# Patient Record
Sex: FEMALE | Race: WHITE | NOT HISPANIC OR LATINO | Employment: PART TIME | URBAN - METROPOLITAN AREA
[De-identification: names, ages, dates, MRNs, and addresses within clinical notes are randomized per-mention and may not be internally consistent; named-entity substitution may affect disease eponyms.]

---

## 2022-04-11 ENCOUNTER — HOSPITAL ENCOUNTER (INPATIENT)
Facility: CLINIC | Age: 32
LOS: 2 days | Discharge: HOME | End: 2022-04-14
Attending: PSYCHIATRY & NEUROLOGY | Admitting: PSYCHIATRY & NEUROLOGY
Payer: COMMERCIAL

## 2022-04-11 DIAGNOSIS — F41.1 GAD (GENERALIZED ANXIETY DISORDER): ICD-10-CM

## 2022-04-11 DIAGNOSIS — Z20.822 LAB TEST NEGATIVE FOR COVID-19 VIRUS: ICD-10-CM

## 2022-04-11 DIAGNOSIS — F32.A DEPRESSION WITH SUICIDAL IDEATION: ICD-10-CM

## 2022-04-11 DIAGNOSIS — L29.2 VULVAR PRURITUS: Primary | ICD-10-CM

## 2022-04-11 DIAGNOSIS — R45.851 DEPRESSION WITH SUICIDAL IDEATION: ICD-10-CM

## 2022-04-11 DIAGNOSIS — J01.90 ACUTE SINUSITIS WITH SYMPTOMS > 10 DAYS: ICD-10-CM

## 2022-04-11 DIAGNOSIS — K30 MILD DIETARY INDIGESTION: ICD-10-CM

## 2022-04-11 LAB
ALBUMIN UR-MCNC: NEGATIVE MG/DL
AMPHETAMINES UR QL SCN: NORMAL
APPEARANCE UR: CLEAR
BACTERIA #/AREA URNS HPF: ABNORMAL /HPF
BARBITURATES UR QL: NORMAL
BENZODIAZ UR QL: NORMAL
BILIRUB UR QL STRIP: NEGATIVE
CANNABINOIDS UR QL SCN: NORMAL
COCAINE UR QL: NORMAL
COLOR UR AUTO: ABNORMAL
GLUCOSE UR STRIP-MCNC: NEGATIVE MG/DL
HCG UR QL: NEGATIVE
HGB UR QL STRIP: ABNORMAL
KETONES UR STRIP-MCNC: NEGATIVE MG/DL
LEUKOCYTE ESTERASE UR QL STRIP: NEGATIVE
MUCOUS THREADS #/AREA URNS LPF: PRESENT /LPF
NITRATE UR QL: NEGATIVE
OPIATES UR QL SCN: NORMAL
PH UR STRIP: 6.5 [PH] (ref 5–7)
RBC URINE: 3 /HPF
SARS-COV-2 RNA RESP QL NAA+PROBE: NEGATIVE
SP GR UR STRIP: 1 (ref 1–1.03)
SQUAMOUS EPITHELIAL: 1 /HPF
UROBILINOGEN UR STRIP-MCNC: NORMAL MG/DL
WBC URINE: 3 /HPF

## 2022-04-11 PROCEDURE — 99285 EMERGENCY DEPT VISIT HI MDM: CPT | Mod: 25 | Performed by: PSYCHIATRY & NEUROLOGY

## 2022-04-11 PROCEDURE — 99285 EMERGENCY DEPT VISIT HI MDM: CPT | Performed by: PSYCHIATRY & NEUROLOGY

## 2022-04-11 PROCEDURE — U0003 INFECTIOUS AGENT DETECTION BY NUCLEIC ACID (DNA OR RNA); SEVERE ACUTE RESPIRATORY SYNDROME CORONAVIRUS 2 (SARS-COV-2) (CORONAVIRUS DISEASE [COVID-19]), AMPLIFIED PROBE TECHNIQUE, MAKING USE OF HIGH THROUGHPUT TECHNOLOGIES AS DESCRIBED BY CMS-2020-01-R: HCPCS | Performed by: PSYCHIATRY & NEUROLOGY

## 2022-04-11 PROCEDURE — 80307 DRUG TEST PRSMV CHEM ANLYZR: CPT | Performed by: PSYCHIATRY & NEUROLOGY

## 2022-04-11 PROCEDURE — 90791 PSYCH DIAGNOSTIC EVALUATION: CPT

## 2022-04-11 PROCEDURE — C9803 HOPD COVID-19 SPEC COLLECT: HCPCS | Performed by: PSYCHIATRY & NEUROLOGY

## 2022-04-11 PROCEDURE — 81001 URINALYSIS AUTO W/SCOPE: CPT | Performed by: PSYCHIATRY & NEUROLOGY

## 2022-04-11 PROCEDURE — 250N000013 HC RX MED GY IP 250 OP 250 PS 637: Performed by: PSYCHIATRY & NEUROLOGY

## 2022-04-11 PROCEDURE — 81025 URINE PREGNANCY TEST: CPT | Performed by: PSYCHIATRY & NEUROLOGY

## 2022-04-11 RX ORDER — TRAZODONE HYDROCHLORIDE 50 MG/1
25 TABLET, FILM COATED ORAL AT BEDTIME
COMMUNITY
Start: 2022-03-23

## 2022-04-11 RX ORDER — LANOLIN ALCOHOL/MO/W.PET/CERES
3 CREAM (GRAM) TOPICAL
Status: DISCONTINUED | OUTPATIENT
Start: 2022-04-11 | End: 2022-04-14 | Stop reason: HOSPADM

## 2022-04-11 RX ORDER — HYDROXYZINE PAMOATE 25 MG/1
25-50 CAPSULE ORAL EVERY 8 HOURS PRN
COMMUNITY
Start: 2022-03-09

## 2022-04-11 RX ORDER — CITALOPRAM HYDROBROMIDE 10 MG/1
5 TABLET ORAL
Status: ON HOLD | COMMUNITY
Start: 2022-04-06 | End: 2022-04-12

## 2022-04-11 RX ORDER — ASCORBIC ACID 500 MG
TABLET ORAL
Status: ON HOLD | COMMUNITY
End: 2022-04-12

## 2022-04-11 RX ORDER — LANOLIN ALCOHOL/MO/W.PET/CERES
1000 CREAM (GRAM) TOPICAL DAILY
Status: DISCONTINUED | OUTPATIENT
Start: 2022-04-12 | End: 2022-04-14 | Stop reason: HOSPADM

## 2022-04-11 RX ORDER — DULOXETIN HYDROCHLORIDE 30 MG/1
30 CAPSULE, DELAYED RELEASE ORAL
Status: ON HOLD | COMMUNITY
Start: 2022-03-02 | End: 2022-04-12

## 2022-04-11 RX ORDER — LORAZEPAM 0.5 MG/1
0.5 TABLET ORAL 2 TIMES DAILY PRN
Status: ON HOLD | COMMUNITY
Start: 2022-03-29 | End: 2022-04-14

## 2022-04-11 RX ORDER — ACETAMINOPHEN 500 MG
500-1000 TABLET ORAL
Status: ON HOLD | COMMUNITY
End: 2022-04-12

## 2022-04-11 RX ORDER — BUSPIRONE HYDROCHLORIDE 5 MG/1
TABLET ORAL
Status: ON HOLD | COMMUNITY
Start: 2022-03-09 | End: 2022-04-12

## 2022-04-11 RX ORDER — LORAZEPAM 0.5 MG/1
0.5 TABLET ORAL DAILY PRN
Status: DISCONTINUED | OUTPATIENT
Start: 2022-04-11 | End: 2022-04-14 | Stop reason: HOSPADM

## 2022-04-11 RX ORDER — DULOXETIN HYDROCHLORIDE 30 MG/1
30 CAPSULE, DELAYED RELEASE ORAL DAILY
Status: DISCONTINUED | OUTPATIENT
Start: 2022-04-12 | End: 2022-04-12

## 2022-04-11 RX ORDER — CITALOPRAM HYDROBROMIDE 10 MG/1
10 TABLET ORAL DAILY
Status: DISCONTINUED | OUTPATIENT
Start: 2022-04-12 | End: 2022-04-12

## 2022-04-11 RX ORDER — BUSPIRONE HYDROCHLORIDE 5 MG/1
5 TABLET ORAL 3 TIMES DAILY
Status: DISCONTINUED | OUTPATIENT
Start: 2022-04-11 | End: 2022-04-12

## 2022-04-11 RX ORDER — HYDROXYZINE HYDROCHLORIDE 25 MG/1
25-50 TABLET, FILM COATED ORAL DAILY PRN
Status: DISCONTINUED | OUTPATIENT
Start: 2022-04-11 | End: 2022-04-13

## 2022-04-11 RX ADMIN — NICOTINE POLACRILEX 2 MG: 2 GUM, CHEWING BUCCAL at 19:25

## 2022-04-11 ASSESSMENT — ENCOUNTER SYMPTOMS
HALLUCINATIONS: 0
CARDIOVASCULAR NEGATIVE: 1
MUSCULOSKELETAL NEGATIVE: 1
RESPIRATORY NEGATIVE: 1
GASTROINTESTINAL NEGATIVE: 1
CONSTITUTIONAL NEGATIVE: 1
NERVOUS/ANXIOUS: 1
NEUROLOGICAL NEGATIVE: 1
HYPERACTIVE: 0
DECREASED CONCENTRATION: 1
EYES NEGATIVE: 1

## 2022-04-11 NOTE — SAFE
Silvia Tidwell  April 11, 2022  SAFE Note    Critical Safety Issues: hopelessness, acute anxiety attacks, sleep difficulties      Current Suicidal Ideation/Self-Injurious Concerns/Methods: Other shooting with a gun.      Current or Historical Inappropriate Sexual Behavior: No      Current or Historical Aggression/Homicidal Ideation: None - N/A      Triggers: medications with side effects.     Updated care team: Yes: MD, intake    For additional details see full [facility] assessment.       URSULA Urias

## 2022-04-11 NOTE — ED TRIAGE NOTES
Patient presents with UTI and increased anxiety with multiple medication changes. Patient would like to be evaluated for UTI and admitted for medication management.
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
## 2022-04-11 NOTE — ED PROVIDER NOTES
"ED Provider Note  [facility]      History     Chief Complaint   Patient presents with     Medication Reaction     Patient is having many issues with changes to medications. Patient is sensitive to medications and has had intermittent thoughts of blowing her brains out. Does not feel suicidal today, but was told to get admitted for medication management.      UTI     Patient was on Macrobid, but stopped it before all the pills were gone. Now UTI symptoms have returned.     HPI  Silvia Tidwell is a 32 year old female who is here accompanied by mother and daughter. Patient has a long history of depression and has had multiple treatments and interventions including hospital stays at [place] in early March 2022 and [place] late March 2022. Patient reports feeling better temporarily. She reports that the meds are not helping and her psychiatric providers have given up on her. She has new psychiatric appointment on 4/28 and therapy services next May. In the meantime she has been feeling overwhelmed. 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 She sleeps poorly and is unable to relax. She reports dealing with a UTI and had been on antibiotics. She has started to feel helpless and hopeless and was thinking of \"blowing her brains out\" yesterday. Her doctor told her to consider [facility] here at [place] as ketamine treatment is provided here.    Patient denies drug use. She is considering starting CBD treatment to address her mood. She denies acute medical concerns and has been vaccinated against COVID. She denies any COVID symptoms presently.    Please see [facility] Crisis Assessment on 4/11/22 in Epic for further details.    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 No pertinent surgical history.  FAMILY HISTORY  History " reviewed. No pertinent family history.  SOCIAL HISTORY  Social History     Tobacco Use     Smoking status: Current Every Day Smoker     Packs/day: 1.00     Types: Cigarettes     Smokeless tobacco: Never Used   Substance Use Topics     Alcohol use: Yes     Comment: rarely     MEDICATIONS  No current facility-administered medications for this encounter.     Current Outpatient Medications   Medication     acetaminophen (TYLENOL) 500 MG tablet     amoxicillin-clavulanate (AUGMENTIN) 500-125 MG per tablet     busPIRone (BUSPAR) 5 MG tablet     citalopram (CELEXA) 10 MG tablet     DULoxetine (CYMBALTA) 30 MG capsule     hydrOXYzine (VISTARIL) 25 MG capsule     LORazepam (ATIVAN) 0.5 MG tablet     melatonin 3 mg, with vit B6 10 mg, 3-10 MG extended release tablet     sertraline (ZOLOFT) 50 MG tablet     traZODone (DESYREL) 50 MG tablet     vitamin B-12 (CYANOCOBALAMIN) 1000 MCG tablet     vitamin C (ASCORBIC ACID) 500 MG tablet     vitamin D3 (CHOLECALCIFEROL) 250 mcg (31637 units) capsule     ALLERGIES  Allergies   Allergen Reactions     Eggs [Chicken-Derived Products (Egg)]      Flagyl [Metronidazole]      Amoxicillin Nausea and Vomiting          Review of Systems   Constitutional: Negative.    HENT: Negative.    Eyes: Negative.    Respiratory: Negative.    Cardiovascular: Negative.    Gastrointestinal: Negative.    Genitourinary: Negative.    Musculoskeletal: Negative.    Skin: Negative.    Neurological: Negative.    Psychiatric/Behavioral: Positive for decreased concentration and suicidal ideas. Negative for hallucinations. The patient is nervous/anxious. The patient is not hyperactive.    All other systems reviewed and are negative.        Physical Exam   BP: 117/75  Pulse: 90  Temp: 98.3  F (36.8  C)  Resp: 18  Weight: 111.1 kg (245 lb)  SpO2: 99 %  Physical Exam  Vitals and nursing note reviewed.   HENT:      Head: Normocephalic.   Eyes:      Pupils: Pupils are equal, round, and reactive to light.   Pulmonary:       Effort: Pulmonary effort is normal.   Musculoskeletal:         General: Normal range of motion.      Cervical back: Normal range of motion.   Neurological:      General: No focal deficit present.      Mental Status: She is alert.   Psychiatric:         Attention and Perception: Attention and perception normal. She does not perceive auditory or visual hallucinations.         Mood and Affect: Affect normal. Mood is anxious.         Speech: Speech normal.         Behavior: Behavior normal. Behavior is not agitated, slowed, aggressive or hyperactive. Behavior is cooperative.         Thought Content: Thought content is not paranoid or delusional. Thought content includes suicidal ideation. Thought content does not include homicidal ideation.         Cognition and Memory: Cognition and memory normal.         Judgment: Judgment normal.         ED Course      Procedures         Mental Health Risk Assessment      PSS-3    Date and Time Over the past 2 weeks have you felt down, depressed, or hopeless? Over the past 2 weeks have you had thoughts of killing yourself? Have you ever attempted to kill yourself? When did this last happen? User   04/11/22 1401 yes yes no -- TMW      C-SSRS ([place])    Date and Time Q1 Wished to be Dead (Past Month) Q2 Suicidal Thoughts (Past Month) Q3 Suicidal Thought Method Q4 Suicidal Intent without Specific Plan Q5 Suicide Intent with Specific Plan Q6 Suicide Behavior (Lifetime) Within the Past 3 Months? RETIRED: Level of Risk per Screen Screening Not Complete User   04/11/22 1401 yes yes yes no no no -- -- -- TMW              Suicide assessment completed by mental health (D.E.C., LCSW, etc.)       Results for orders placed or performed during the hospital encounter of 04/11/22   HCG qualitative urine     Status: Normal   Result Value Ref Range    hCG Urine Qualitative Negative Negative   Drug abuse screen 1 urine (ED)     Status: Normal   Result Value Ref Range    Amphetamines Urine Screen  "Negative Screen Negative    Barbiturates Urine Screen Negative Screen Negative    Benzodiazepines Urine Screen Negative Screen Negative    Cannabinoids Urine Screen Negative Screen Negative    Cocaine Urine Screen Negative Screen Negative    Opiates Urine Screen Negative Screen Negative   UA with Microscopic reflex to Culture     Status: Abnormal    Specimen: Urine, Midstream   Result Value Ref Range    Color Urine Straw Colorless, Straw, Light Yellow, Yellow    Appearance Urine Clear Clear    Glucose Urine Negative Negative mg/dL    Bilirubin Urine Negative Negative    Ketones Urine Negative Negative mg/dL    Specific Gravity Urine 1.003 1.003 - 1.035    Blood Urine Moderate (A) Negative    pH Urine 6.5 5.0 - 7.0    Protein Albumin Urine Negative Negative mg/dL    Urobilinogen Urine Normal Normal, 2.0 mg/dL    Nitrite Urine Negative Negative    Leukocyte Esterase Urine Negative Negative    Bacteria Urine Few (A) None Seen /HPF    Mucus Urine Present (A) None Seen /LPF    RBC Urine 3 (H) <=2 /HPF    WBC Urine 3 <=5 /HPF    Squamous Epithelials Urine 1 <=1 /HPF    Narrative    Urine Culture not indicated   Urine Drugs of Abuse Screen     Status: Normal    Narrative    The following orders were created for panel order Urine Drugs of Abuse Screen.  Procedure                               Abnormality         Status                     ---------                               -----------         ------                     Drug abuse screen 1 urin...[[id number]]  Normal              Final result                 Please view results for these tests on the individual orders.     Medications - No data to display     Assessments & Plan (with Medical Decision Making)   Patient with persistent depression that appears treatment-resistant. She is feeling hopeless and helpless and was recently suicidal with thoughts of \"blowing her brains out.\" She would like to be admitted for further intervention. She tried inpatient treatment at [facility] last month and did not feel lasting benefit. She seeks care and intervention here, specifically ketamine if possible. Patient is referred for admission. She is too acute for an IOP or PHP as she needs urgent medication intervention rather than programmatic care which she may benefit from once stabilization. She is voluntary.    I have reviewed the nursing notes. I have reviewed the findings, diagnosis, plan and need for follow up with the patient.    New Prescriptions    No medications on file       Final diagnoses:   Depression with suicidal ideation   JAE (generalized anxiety disorder)       --  Murali Aguirre MD  [facility] EMERGENCY DEPARTMENT  4/11/2022     Murali Aguirre MD  04/11/22 1955

## 2022-04-12 ENCOUNTER — APPOINTMENT (OUTPATIENT)
Dept: MRI IMAGING | Facility: CLINIC | Age: 32
End: 2022-04-12
Payer: COMMERCIAL

## 2022-04-12 PROBLEM — R45.851 DEPRESSION WITH SUICIDAL IDEATION: Status: ACTIVE | Noted: 2022-04-12

## 2022-04-12 PROBLEM — F32.A DEPRESSION WITH SUICIDAL IDEATION: Status: ACTIVE | Noted: 2022-04-12

## 2022-04-12 LAB
DEPRECATED CALCIDIOL+CALCIFEROL SERPL-MC: 30 UG/L (ref 20–75)
HOLD SPECIMEN: NORMAL
HOLD SPECIMEN: NORMAL

## 2022-04-12 PROCEDURE — 99223 1ST HOSP IP/OBS HIGH 75: CPT | Mod: AI | Performed by: PSYCHIATRY & NEUROLOGY

## 2022-04-12 PROCEDURE — 124N000002 HC R&B MH UMMC

## 2022-04-12 PROCEDURE — 250N000013 HC RX MED GY IP 250 OP 250 PS 637: Performed by: STUDENT IN AN ORGANIZED HEALTH CARE EDUCATION/TRAINING PROGRAM

## 2022-04-12 PROCEDURE — 70551 MRI BRAIN STEM W/O DYE: CPT

## 2022-04-12 PROCEDURE — 36415 COLL VENOUS BLD VENIPUNCTURE: CPT | Performed by: STUDENT IN AN ORGANIZED HEALTH CARE EDUCATION/TRAINING PROGRAM

## 2022-04-12 PROCEDURE — 82306 VITAMIN D 25 HYDROXY: CPT | Performed by: STUDENT IN AN ORGANIZED HEALTH CARE EDUCATION/TRAINING PROGRAM

## 2022-04-12 PROCEDURE — 70551 MRI BRAIN STEM W/O DYE: CPT | Mod: 26 | Performed by: RADIOLOGY

## 2022-04-12 PROCEDURE — H2032 ACTIVITY THERAPY, PER 15 MIN: HCPCS

## 2022-04-12 PROCEDURE — 250N000013 HC RX MED GY IP 250 OP 250 PS 637: Performed by: PSYCHIATRY & NEUROLOGY

## 2022-04-12 RX ORDER — OLANZAPINE 10 MG/2ML
10 INJECTION, POWDER, FOR SOLUTION INTRAMUSCULAR 3 TIMES DAILY PRN
Status: DISCONTINUED | OUTPATIENT
Start: 2022-04-12 | End: 2022-04-14 | Stop reason: HOSPADM

## 2022-04-12 RX ORDER — POLYETHYLENE GLYCOL 3350 17 G/17G
17 POWDER, FOR SOLUTION ORAL DAILY PRN
Status: DISCONTINUED | OUTPATIENT
Start: 2022-04-12 | End: 2022-04-14 | Stop reason: HOSPADM

## 2022-04-12 RX ORDER — ACETAMINOPHEN 325 MG/1
650 TABLET ORAL EVERY 4 HOURS PRN
Status: DISCONTINUED | OUTPATIENT
Start: 2022-04-12 | End: 2022-04-14 | Stop reason: HOSPADM

## 2022-04-12 RX ORDER — MAGNESIUM HYDROXIDE/ALUMINUM HYDROXICE/SIMETHICONE 120; 1200; 1200 MG/30ML; MG/30ML; MG/30ML
30 SUSPENSION ORAL EVERY 4 HOURS PRN
Status: DISCONTINUED | OUTPATIENT
Start: 2022-04-12 | End: 2022-04-14 | Stop reason: HOSPADM

## 2022-04-12 RX ORDER — OLANZAPINE 5 MG/1
10 TABLET ORAL 3 TIMES DAILY PRN
Status: DISCONTINUED | OUTPATIENT
Start: 2022-04-12 | End: 2022-04-14 | Stop reason: HOSPADM

## 2022-04-12 RX ADMIN — CYANOCOBALAMIN TAB 1000 MCG 1000 MCG: 1000 TAB at 08:38

## 2022-04-12 RX ADMIN — NICOTINE POLACRILEX 2 MG: 2 GUM, CHEWING BUCCAL at 14:08

## 2022-04-12 RX ADMIN — ACETAMINOPHEN 650 MG: 325 TABLET ORAL at 18:28

## 2022-04-12 RX ADMIN — ACETAMINOPHEN 650 MG: 325 TABLET ORAL at 08:44

## 2022-04-12 RX ADMIN — MELATONIN TAB 3 MG 3 MG: 3 TAB at 01:21

## 2022-04-12 RX ADMIN — MELATONIN TAB 3 MG 3 MG: 3 TAB at 22:56

## 2022-04-12 RX ADMIN — NICOTINE POLACRILEX 2 MG: 2 GUM, CHEWING BUCCAL at 18:31

## 2022-04-12 ASSESSMENT — ACTIVITIES OF DAILY LIVING (ADL)
LAUNDRY: WITH SUPERVISION
ORAL_HYGIENE: INDEPENDENT
HYGIENE/GROOMING: INDEPENDENT
ORAL_HYGIENE: INDEPENDENT
HYGIENE/GROOMING: INDEPENDENT
DRESS: INDEPENDENT
DRESS: STREET CLOTHES;INDEPENDENT
LAUNDRY: WITH SUPERVISION

## 2022-04-12 NOTE — PLAN OF CARE
Problem: Sleep Disturbance  Goal: Adequate Sleep/Rest  Outcome: Ongoing, Progressing      At 0100, a 32 year old patient arrived station 20 on wheel chair, brought by psych associate  from [facility]. Per ED note, patient has had multiple psychiatrics hospital stay,  Pt reports several med changes that are not helping. 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 She has started to feel hopeless and overwhelmed and thinking of blowing her brain out. Pt was encouraged to come in by he PCP for stabilization.  Patient appeared  tired when she arrived at [facility]. Patient was cooperative with search and admission process. Denies SI/SIB/hallucination and thought of harming herself. Denies anxiety and depression, and denies pain. Patient verbalized to contract for safety. PRN melatonin 3 mg was given per patient request, and patient is sleeping at this time.    Pt appeared to have slept for 5 hours.15 minutes safety checks was in place during shift.Staff will continue to  monitor and offer support as needed to patient.

## 2022-04-12 NOTE — PHARMACY-ADMISSION MEDICATION HISTORY
Admission Medication History Completed by Pharmacy    See [facility] Admission Navigator for allergy information, preferred outpatient pharmacy, prior to admission medications and immunization status.     Medication History Sources:     Patient    [facility] from 3/29/22    Changes made to PTA medication list (reason):    Added: None    Deleted: acetaminophen, Aumgentin, buspirone, citalopram, duloxetine, sertraline, vitamin C (not taking per pt)    Changed:   o Added directions to lorazepam, hydroxyzine, melatonin, trazodone, vit D    Additional Information:    Pt recently admitted to [facility] 3/23-3/29; buspirone, citalopram, and clonidine were discontinued during that hospitalization.    Pt reported she had side effects to citalopram and sertraline causing increased SI and anxiety    Pt was recently treated for a UTI (with nitrofurantoin) and sinusitis (with amoxicillin and then Augmentin). She feels these may not be resolved as she stopped antibiotics early.    Pt reports her CPAP machine at home may not be fitting right and the apnea may be causing headaches/migraines. Pt asking about getting this assessed while here.  Per [place] :  Lorazepam 0.5 mg filled 3/29/22 for #30 (15 DS), 3/11/22 for #20 (10 DS)  Zolpidem 5 mg filled 3/2/22 for #30 (30 DS)  Alprazolam 0.25 mg filled 3/2/22 for #10 (4 DS)  Diazepam 2 mg filled 2/28/22 for #3 (1 DS)      Prior to Admission medications    Medication Sig Last Dose Taking? Auth Provider   hydrOXYzine (VISTARIL) 25 MG capsule Take 25-50 mg by mouth every 8 hours as needed for anxiety (sleep) Past Week at Unknown time Yes Reported, Patient   LORazepam (ATIVAN) 0.5 MG tablet Take 0.5 mg by mouth 2 times daily as needed for anxiety 4/10/2022 at Unknown time Yes Reported, Patient   melatonin 3 mg, with vit B6 10 mg, 3-10 MG extended release tablet Take 3 mg by mouth nightly as needed for  sleep 4/11/2022 at Unknown time Yes Reported, Patient   traZODone (DESYREL) 50 MG tablet Take 25 mg by mouth At Bedtime Past Month at Unknown time Yes Reported, Patient   vitamin B-12 (CYANOCOBALAMIN) 1000 MCG tablet Take 1,000 mcg by mouth in the morning. Past Week at Unknown time Yes Reported, Patient   Vitamin D3 (CHOLECALCIFEROL) 125 MCG (5000 UT) tablet Take 125 mcg by mouth in the morning. Past Week at Unknown time Yes Unknown, Entered By History       Date completed: 04/12/22    Medication history completed by: Nia Barba, [facility]

## 2022-04-12 NOTE — ED NOTES
Writer attempted to call report to station 22, per MICHAEL Braun, the unit is not able to take the patient at this time.

## 2022-04-12 NOTE — PLAN OF CARE
Problem: Suicidal Behavior  Goal: Suicidal Behavior is Absent or Managed  Outcome: Ongoing, Progressing     Patient up and visible on the unit most part of he shift. Engaged and social with select peers, watching TV, attended group, endorsed anxiety and depression, denies all other psych symptoms, complained of generalized body pain, medicated patient with PRN tylenol. Patient is able to make needs known, needy for the most part, eating and drinking well, no other known concerns at this time. Will continue to offer support as needed for the rest of the shift.

## 2022-04-12 NOTE — DISCHARGE INSTRUCTIONS
Behavioral Discharge Planning and Instructions    Summary:   You were admitted to [facility] on 4/11/22 with worsening depression/anxiety.  You met with Psychiatric team daily for ongoing psychiatric assessment and medication management.  You had opportunities to participate in therapeutic groups on the unit.   At this time you report your mood is stabilizing and you report you are not having thoughts or intent to harm yourself or others. You will be discharged home and will resume care with your outpatient providers.    Disposition:  Home      Main Diagnosis:   MDD  JAE    Health Care Follow-up:   Appointment:  Psychiatrist: Baldev Hood MD: Intake appt. 4/28/22  [facility] [place]  [address] [place] [address]  [phone]               Permanent Therapist Leno Burciaga PsyD: first appt.  5/3/22  [address] [place] [address] [phone]    Therapist: Temp. Therapist: Latha ELAINE next appt. 4/13/22  [address] [place] [address]  Phone: [phone]  Fax: [phone]     [facility] worker:  [facility],  [address],  [place] [address] [phone]    Major Treatments, Procedures and Findings:   Medications were  managed throughout your stay. An internal medicine consult was completed during your stay. You had the opportunity to participate in treatment programming while on the unit including occupational therapy, mental health support and education and spiritual services.     Symptoms to Report:   Please report if you are experiencing increased aggression and/or confusion, problematic loss of sleep, worsening mood, or thoughts of suicide to your treatment team or notify your primary provider.   IF THE SYMPTOMS YOU ARE EXPERIENCING ARE A MEDICAL EMERGENCY, CALL 911 IMMEDIATELY    Lifestyle Adjustment:   1. Adjust your lifestyle to get enough sleep, relaxation, exercise and good nutrition.  Continue to develop healthy coping skills to decrease  stress and promote a healthy and sober lifestyle.  2. Abstain from all substances of abuse.  3. Take medications as prescribed.  Please work with your doctor to discuss any concerns you have with your medications or side effects you may be experiencing.  4. Follow up with appointments as scheduled.      Resources:   Mental Health Crisis Services for [facility]    [phone] or [phone]  24 hours a day/7 days a week    The Crisis Response Team (CRT) is a group of counselors who provide support and assistance to children and adults experiencing a mental health crisis. Call-in services include a crisis hotline, information and referrals, and a link to resources and support. If needed the CRT will travel to your home or a community location to de-escalate the situation and help the individual in crisis cope with immediate stressors.    If further intervention or de-escalation is needed, Overnight Crisis Care and Stabilization is available for youth ages 5-17, and the Residential Adult Stabilization program provides a safe place for adults to stay. Crisis Text Line  Text [place] to 353651    Free 24/7 support at your fingertips from anywhere, anytime, about any type of crisis. A live, trained Crisis Counselor receives the text and responds, all from a secure online platform. The volunteer Crisis Counselor will help you move from a hot moment to a cool moment.    National Suicide Prevention Lifeline  3-604-347-TALK (6463)  24 hours a day/7 days a week        [facility] ~ Peer Support Connection  Call or Text: [phone], 5:00pm to 9:00am  The Peer Support Connection Warmline [facility] is staffed by mental health consumers who provide support to peers by telephone. All phone calls are confidential and callers remain anonymous. Warmline Support Specialists are trained to actively listen to their peers, empathize with their concerns and empower individuals to choose their path to wellness and recovery. If  "you'd like to talk to someone who truley understands your struggle and has \"been there before\" please call or text.      General Medication Instructions:   See your medication sheet(s) for instructions.   Take all medicines as directed.  Make no changes unless your doctor suggests them.   Go to all your doctor visits.  Be sure to have all your required lab tests. This way, your medicines can be refilled on time.  Do not use any drugs not prescribed by your doctor.    Advance Directives:   Scanned document on file with [employer]? No scanned doc  Is document scanned? Pt states no documents  Honoring Choices Your Rights Handout: Informed and given  Was more information offered? Materials given    The Treatment team has appreciated the opportunity to work with you. If you have any questions or concerns about your recent admission, you can contact the unit which can receive your call 24 hours a day, 7 days a week. They will be able to get in touch with a Provider if needed. The unit number is [phone]   "

## 2022-04-12 NOTE — PLAN OF CARE
04/12/22 0242   Patient Belongings   Patient Belongings locker;sent to security per site process   Patient Belongings Put in Hospital Secure Location (Security or Locker, etc.) cash/credit card;cell phone/electronics;clothing;keys;money (see comment);medication(s);purse/wallet;suitcase;other (see comments)   Belongings Search Yes   Clothing Search Yes   Second Staff Mima HOLLINGSWORTH                  Patient arrived to unit with the following belongings:  Clothing: Shirt(s):  , Pants:  , Underclothes:  , and Outerwear:    1 black hooded sweater, 1 blue pajama pants, 1 grey and yellow hooded sweater, 1 blue and grey pajama pants, 1 blue hooded sweater, 1 grey tank top, 1 black tank top, 1 pink floral shirt, 1 grey and black long sleeve shirt, 1 grey and black t shirt, 1 black and green t shirt, 1 purple and white t shirt, 1 grey camo sweat pants, 1 grey and brown shirt, 1 blue t shirt, 1 tan grey hooded sweater, 5 bras (3 black, 2 tan), 1 blue tank top, 1 green tank top, 1 burgundy long sleeve shirt, 1 pink tie die pants, 5 pair of black pants, 1 green coat, 13 pair of socks (pack of 10 unopened), 8 pair of underwear, 1 colorful blanket. 1 pack of rolled cigarettes in colorful skull pack  Electronic devices including cell phones: Yes. 1 black cell phone(no scratches on front) with burgundy protective case. 1 cell phone  with cord and base. Additional  base no cord.  Jewelry: No   Medications: Yes. Melatonin 3mg, lorazepam 0.5mg, hydroxyzine pamoate 25mg, sertraline 50mg, trazodone 100mg, vitamin D3, pain reliever 500mg, refresh eye drops   Wallet: Yes.  Kade miles purse wallet. Kade marquez purse  Items sent to security: Yes. $54 cash,  [employer] gift card [id number], [employer] card [id number], national bank debit [id number], [employer] card [id number], $50 [employer] gift card, $50 [employer] card, [place] EBT card [id number], [place] card [id number]      Admission:  I am responsible for any personal items that are not sent to the safe or  pharmacy.  [facility] is not responsible for loss, theft or damage of any property in my possession.    Signature:  _________________________________ Date: _______  Time: _____                                              Staff Signature:  ____________________________ Date: ________  Time: _____      2nd Staff person, if patient is unable/unwilling to sign:    Signature: ________________________________ Date: ________  Time: _____     Discharge:  [facility] has returned all of my personal belongings:    Signature: _________________________________ Date: ________  Time: _____                                          Staff Signature:  ____________________________ Date: ________  Time: _____    Goal Outcome Evaluation:

## 2022-04-12 NOTE — ED NOTES
ED to Behavioral Floor Handoff    SITUATION  Silvia Tidwell is a 32 year old female who speaks English and lives in a home with others The patient arrived in the ED by private car from home with a complaint of Medication Reaction (Patient is having many issues with changes to medications. Patient is sensitive to medications and has had intermittent thoughts of blowing her brains out. Does not feel suicidal today, but was told to get admitted for medication management. ) and UTI (Patient was on Macrobid, but stopped it before all the pills were gone. Now UTI symptoms have returned.)  .The patient's current symptoms started/worsened 5 day(s) ago and during this time the symptoms have remained the same.   In the ED, pt was diagnosed with   Final diagnoses:   Depression with suicidal ideation   JAE (generalized anxiety disorder)        Initial vitals were: BP: 117/75  Pulse: 90  Temp: 98.3  F (36.8  C)  Resp: 18  Weight: 111.1 kg (245 lb)  SpO2: 99 %   --------  Is the patient diabetic? No   If yes, last blood glucose? --     If yes, was this treated in the ED? --  --------  Is the patient inebriated (ETOH) No or Impaired on other substances? No  MSSA done? N/A  Last MSSA score: --    Were withdrawal symptoms treated? N/A  Does the patient have a seizure history? No. If yes, date of most recent seizure NA    Is the patient patient experiencing suicidal ideation? reports occasional suicidal thoughts representing feeling that life is not worth feeling    Homicidal ideation? denies current or recent homicidal ideation or behaviors.    Self-injurious behavior/urges? denies current or recent self injurious behavior or ideation.  ------  Was pt aggressive in the ED No  Was a code called No  Is the pt now cooperative? Yes  -------  Meds given in ED:   Medications   nicotine (NICORETTE) gum 2 mg (2 mg Buccal Given 4/11/22 1925)   sertraline (ZOLOFT) tablet 50 mg (has no administration in time range)   citalopram (celeXA) tablet  10 mg (has no administration in time range)   LORazepam (ATIVAN) tablet 0.5 mg (has no administration in time range)   busPIRone (BUSPAR) tablet 5 mg (5 mg Oral Not Given 4/11/22 2233)   hydrOXYzine (ATARAX) tablet 25-50 mg (has no administration in time range)   DULoxetine (CYMBALTA) DR capsule 30 mg (has no administration in time range)   cyanocobalamin (VITAMIN B-12) tablet 1,000 mcg (has no administration in time range)   melatonin tablet 3 mg (has no administration in time range)      Family present during ED course? Yes  Family currently present? No    BACKGROUND  Does the patient have a cognitive impairment or developmental disability? No  Allergies:   Allergies   Allergen Reactions     Eggs [Chicken-Derived Products (Egg)]      Flagyl [Metronidazole]      Amoxicillin Nausea and Vomiting   .   Social demographics are   Social History     Socioeconomic History     Marital status: Single     Spouse name: None     Number of children: None     Years of education: None     Highest education level: None   Tobacco Use     Smoking status: Current Every Day Smoker     Packs/day: 1.00     Types: Cigarettes     Smokeless tobacco: Never Used   Substance and Sexual Activity     Alcohol use: Yes     Comment: rarely     Drug use: No     Sexual activity: Yes     Partners: Male     Birth control/protection: None        ASSESSMENT  Labs results   Labs Ordered and Resulted from Time of ED Arrival to Time of ED Departure   ROUTINE UA WITH MICROSCOPIC REFLEX TO CULTURE - Abnormal       Result Value    Color Urine Straw      Appearance Urine Clear      Glucose Urine Negative      Bilirubin Urine Negative      Ketones Urine Negative      Specific Gravity Urine 1.003      Blood Urine Moderate (*)     pH Urine 6.5      Protein Albumin Urine Negative      Urobilinogen Urine Normal      Nitrite Urine Negative      Leukocyte Esterase Urine Negative      Bacteria Urine Few (*)     Mucus Urine Present (*)     RBC Urine 3 (*)     WBC Urine  3      Squamous Epithelials Urine 1     HCG QUALITATIVE URINE - Normal    hCG Urine Qualitative Negative     DRUG ABUSE SCREEN 1 URINE (ED) - Normal    Amphetamines Urine Screen Negative      Barbiturates Urine Screen Negative      Benzodiazepines Urine Screen Negative      Cannabinoids Urine Screen Negative      Cocaine Urine Screen Negative      Opiates Urine Screen Negative     COVID-19 VIRUS (CORONAVIRUS) BY PCR - Normal    SARS CoV2 PCR Negative        Imaging Studies: No results found for this or any previous visit (from the past 24 hour(s)).   Most recent vital signs /75 (BP Location: Left arm, Patient Position: Sitting)   Pulse 90   Temp 98.3  F (36.8  C) (Oral)   Resp 18   Wt 111.1 kg (245 lb)   LMP 05/02/2016   SpO2 99%   BMI 39.54 kg/m     Abnormal labs/tests/findings requiring intervention:---   Pain control: pt had none  Nausea control: pt had none    RECOMMENDATION  Are any infection precautions needed (MRSA, VRE, etc.)? No If yes, what infection? --  ---  Does the patient have mobility issues? independently. If yes, what device does the pt use? ---  ---  Is patient on 72 hour hold or commitment? No If on 72 hour hold, have hold and rights been given to patient? N/A  Are admitting orders written if after 10 p.m. ?N/A  Tasks needing to be completed none    Katherine Barry RN   [facility] [place]   [phone] [facility]   [phone] [facility] ED
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
## 2022-04-12 NOTE — PLAN OF CARE
Pt has been in a mostly irritable, tense mood throughout the day. Upon waking and first approaching the nursing station, pt began communicating that she was unsatisfied with her care so far at the hospital and was considering discharging herself. Writer offered empathetic listening and assessed pt's needs at that time. As requested, pt was oriented to the unit, breakfast food items were obtained, medications were discussed and hospital contact information was provided for pt to give to family members. Pt was encouraged to attend groups and discuss her psychiatric treatment concerns with her doctor later today. Pt had c/o headache, for which she received PRN Tylenol (650mg). She refused all morning medications, except the B-12 vitamin, stating that they were not her current correct medication regimen. She denies any current, active suicidal ideation but does endorse feeling frustrated and hopeless with being able to find the psychiatric care she feels she needs in the context of multiple, recent inpatient hospitalizations.     Problem: Plan of Care - These are the overarching goals to be used throughout the patient stay.    Goal: Plan of Care Review/Shift Note  Description: The Plan of Care Review/Shift note should be completed every shift.  The Outcome Evaluation is a brief statement about your assessment that the patient is improving, declining, or no change.  This information will be displayed automatically on your shift note.  Flowsheets  Taken 4/12/2022 1149  Plan of Care Reviewed With: patient  Taken 4/12/2022 1100  Plan of Care Reviewed With: patient

## 2022-04-12 NOTE — ED NOTES
Writer attempted to call report for station 20. They are not ready for the patent. They asked this writer to call next shift.

## 2022-04-12 NOTE — PROGRESS NOTES
04/12/22 1546   Group Therapy Session   Group Attendance attended group session   Time Session Began 1115   Time Session Ended 1200   Total Time patient participated (minutes) 45   Total # Attendees 3   Group Type expressive therapy   Group Topic Covered emotions/expression   Patient Response/Contribution cooperative with task     Art Therapy directive was to create a vision board collage.  Goals of directive: identifying personal strengths and goals.  Pt was a quiet, engaged participant, focused on task for the full duration of group. Pt has not yet finished collage and needs a second AT group to finish. Pt shared several personal stories during group and became tearful at times.   Pts mood was depressed.
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
## 2022-04-12 NOTE — PLAN OF CARE
04/12/22 1512   Group Therapy Session   Group Attendance attended group session   Time Session Began 0115   Time Session Ended 1510   Total Time patient participated (minutes) 55   Total # Attendees 3   Group Type psychoeducation   Group Topic Covered coping skills/lifestyle management;emotions/expression;anger/conflict management   Group Session Detail Kristy Quezada: Why your critics aren t the one who count   Patient Response/Contribution cooperative with task;expressed readiness to alter behaviors;listened actively;offered helpful suggestions to peers   Patient Response Detail Patient was pleasant and able to engaged in group discussion. She took notes and shared her thoughts on what has been difficult in her path to recovdery. She became tearful when sharing personal life experiences.

## 2022-04-12 NOTE — PROGRESS NOTES
"   04/12/22 1600   Group Therapy Session   Group Attendance attended group session   Time Session Began 1315   Time Session Ended 1400   Total Time patient participated (minutes) 45   Total # Attendees 3   Group Type expressive therapy;psychotherapeutic   Group Topic Covered emotions/expression   Patient Response/Contribution cooperative with task     Topic group discussion was about the DBT skill \"Riding the Wave\" Group discussed distress tolerance and read from handout. Pts were then encouraged to create process art of this DBT skill. Pt chose to continue to work on collage from previous group. Pt's mood was depressed, tearful at times when sharing personal stories with author.        "

## 2022-04-12 NOTE — PLAN OF CARE
04/12/22 0918   Individualization/Patient Specific Goals   Patient Personal Strengths community support;expressive of needs;expressive of emotions;independent living skills;medication/treatment adherence;motivated for treatment;resilient;resourceful;stable living environment   Patient Vulnerabilities adverse childhood experience(s);family/relationship conflict;traumatic event   Anxieties, Fears or Concerns That I wont get better   Patient-Specific Goals (Include Timeframe) 1. Stabilization of mood/anxiety 2. Absence of SI-safe with self  3. Medication mgmt   4. Coordination of care with outpatient providers  5.outpatient follow up care in place   Interprofessional Rounds   Participants CTC;nursing;patient;psychiatrist;other (see comments)  (med students)   Team Discussion   Participants Dr. Carrillo, Dr. Covarrubias, Dr. Shelton, Halle Traore RN, Carolina Heath MA.LP, Med students   Progress Initial assessment   Anticipated length of stay 3-5 days   Continued Stay Criteria/Rationale Worsening depression/SI   Medical/Physical UTI   Plan Psychiatric team to assess patient daily.  Medications will be reviewed/adjusted as indicated per MD's.  Patient will be provided TIPPS skills and other anxiety reduction techniques.  Patient will be encouraged to attend groups/activiteis on the unit.  CTC will continue to assess needs, ensure appropriate f/u care is in place.   Rationale for change in precautions or plan No change in plan/precautions   Safety Plan Patient will complete safety plan   Anticipated Discharge Disposition home or self-care        ------  I have reviewed this note. Lilibeth Carrillo MD

## 2022-04-13 LAB
ALBUMIN UR-MCNC: NEGATIVE MG/DL
APPEARANCE UR: CLEAR
BILIRUB UR QL STRIP: NEGATIVE
CHOLEST SERPL-MCNC: 125 MG/DL
CLUE CELLS: ABNORMAL
COLOR UR AUTO: NORMAL
FOLATE SERPL-MCNC: 17.5 NG/ML
GLUCOSE UR STRIP-MCNC: NEGATIVE MG/DL
HDLC SERPL-MCNC: 37 MG/DL
HGB UR QL STRIP: NEGATIVE
HOLD SPECIMEN: NORMAL
KETONES UR STRIP-MCNC: NEGATIVE MG/DL
LDLC SERPL CALC-MCNC: 69 MG/DL
LEUKOCYTE ESTERASE UR QL STRIP: NEGATIVE
NITRATE UR QL: NEGATIVE
NONHDLC SERPL-MCNC: 88 MG/DL
PH UR STRIP: 6.5 [PH] (ref 5–7)
SP GR UR STRIP: 1 (ref 1–1.03)
TRICHOMONAS, WET PREP: ABNORMAL
TRIGL SERPL-MCNC: 96 MG/DL
TSH SERPL DL<=0.005 MIU/L-ACNC: 1.31 MU/L (ref 0.4–4)
UROBILINOGEN UR STRIP-MCNC: NORMAL MG/DL
VIT B12 SERPL-MCNC: 639 PG/ML (ref 193–986)
WBC'S/HIGH POWER FIELD, WET PREP: ABNORMAL
YEAST, WET PREP: ABNORMAL

## 2022-04-13 PROCEDURE — 81003 URINALYSIS AUTO W/O SCOPE: CPT

## 2022-04-13 PROCEDURE — 80061 LIPID PANEL: CPT | Performed by: STUDENT IN AN ORGANIZED HEALTH CARE EDUCATION/TRAINING PROGRAM

## 2022-04-13 PROCEDURE — 84443 ASSAY THYROID STIM HORMONE: CPT | Performed by: STUDENT IN AN ORGANIZED HEALTH CARE EDUCATION/TRAINING PROGRAM

## 2022-04-13 PROCEDURE — 82746 ASSAY OF FOLIC ACID SERUM: CPT | Performed by: STUDENT IN AN ORGANIZED HEALTH CARE EDUCATION/TRAINING PROGRAM

## 2022-04-13 PROCEDURE — 87210 SMEAR WET MOUNT SALINE/INK: CPT

## 2022-04-13 PROCEDURE — 250N000013 HC RX MED GY IP 250 OP 250 PS 637

## 2022-04-13 PROCEDURE — 250N000013 HC RX MED GY IP 250 OP 250 PS 637: Performed by: PSYCHIATRY & NEUROLOGY

## 2022-04-13 PROCEDURE — 250N000013 HC RX MED GY IP 250 OP 250 PS 637: Performed by: STUDENT IN AN ORGANIZED HEALTH CARE EDUCATION/TRAINING PROGRAM

## 2022-04-13 PROCEDURE — 99232 SBSQ HOSP IP/OBS MODERATE 35: CPT | Mod: GC | Performed by: PSYCHIATRY & NEUROLOGY

## 2022-04-13 PROCEDURE — 82607 VITAMIN B-12: CPT | Performed by: STUDENT IN AN ORGANIZED HEALTH CARE EDUCATION/TRAINING PROGRAM

## 2022-04-13 PROCEDURE — H2032 ACTIVITY THERAPY, PER 15 MIN: HCPCS

## 2022-04-13 PROCEDURE — G0177 OPPS/PHP; TRAIN & EDUC SERV: HCPCS

## 2022-04-13 PROCEDURE — 99221 1ST HOSP IP/OBS SF/LOW 40: CPT

## 2022-04-13 PROCEDURE — 36415 COLL VENOUS BLD VENIPUNCTURE: CPT | Performed by: STUDENT IN AN ORGANIZED HEALTH CARE EDUCATION/TRAINING PROGRAM

## 2022-04-13 PROCEDURE — 124N000002 HC R&B MH UMMC

## 2022-04-13 RX ORDER — DOXYCYCLINE 100 MG/1
100 CAPSULE ORAL EVERY 12 HOURS SCHEDULED
Status: DISCONTINUED | OUTPATIENT
Start: 2022-04-13 | End: 2022-04-14 | Stop reason: HOSPADM

## 2022-04-13 RX ORDER — HYDROXYZINE HYDROCHLORIDE 25 MG/1
25-50 TABLET, FILM COATED ORAL 3 TIMES DAILY PRN
Status: DISCONTINUED | OUTPATIENT
Start: 2022-04-13 | End: 2022-04-14 | Stop reason: HOSPADM

## 2022-04-13 RX ORDER — VITAMIN B COMPLEX
125 TABLET ORAL DAILY
Status: DISCONTINUED | OUTPATIENT
Start: 2022-04-13 | End: 2022-04-14 | Stop reason: HOSPADM

## 2022-04-13 RX ORDER — IBUPROFEN 400 MG/1
400 TABLET, FILM COATED ORAL EVERY 6 HOURS PRN
Status: DISCONTINUED | OUTPATIENT
Start: 2022-04-13 | End: 2022-04-14 | Stop reason: HOSPADM

## 2022-04-13 RX ORDER — DIAPER,BRIEF,INFANT-TODD,DISP
EACH MISCELLANEOUS 2 TIMES DAILY
Status: DISCONTINUED | OUTPATIENT
Start: 2022-04-13 | End: 2022-04-14 | Stop reason: HOSPADM

## 2022-04-13 RX ORDER — LACTOBACILLUS RHAMNOSUS GG 10B CELL
1 CAPSULE ORAL 2 TIMES DAILY
Status: DISCONTINUED | OUTPATIENT
Start: 2022-04-13 | End: 2022-04-14 | Stop reason: HOSPADM

## 2022-04-13 RX ADMIN — DOXYCYCLINE 100 MG: 100 CAPSULE ORAL at 17:49

## 2022-04-13 RX ADMIN — ACETAMINOPHEN 650 MG: 325 TABLET ORAL at 08:52

## 2022-04-13 RX ADMIN — HYDROXYZINE HYDROCHLORIDE 50 MG: 25 TABLET, FILM COATED ORAL at 18:25

## 2022-04-13 RX ADMIN — Medication 1 CAPSULE: at 21:17

## 2022-04-13 RX ADMIN — ACETAMINOPHEN 650 MG: 325 TABLET ORAL at 02:55

## 2022-04-13 RX ADMIN — HYDROCORTISONE: 0.5 CREAM TOPICAL at 21:16

## 2022-04-13 RX ADMIN — NICOTINE POLACRILEX 2 MG: 2 GUM, CHEWING BUCCAL at 02:58

## 2022-04-13 RX ADMIN — NICOTINE POLACRILEX 2 MG: 2 GUM, CHEWING BUCCAL at 12:51

## 2022-04-13 RX ADMIN — CYANOCOBALAMIN TAB 1000 MCG 1000 MCG: 1000 TAB at 08:12

## 2022-04-13 RX ADMIN — NICOTINE POLACRILEX 2 MG: 2 GUM, CHEWING BUCCAL at 15:14

## 2022-04-13 RX ADMIN — NICOTINE POLACRILEX 2 MG: 2 GUM, CHEWING BUCCAL at 19:06

## 2022-04-13 RX ADMIN — ACETAMINOPHEN 650 MG: 325 TABLET ORAL at 15:13

## 2022-04-13 RX ADMIN — NICOTINE POLACRILEX 2 MG: 2 GUM, CHEWING BUCCAL at 08:12

## 2022-04-13 ASSESSMENT — ACTIVITIES OF DAILY LIVING (ADL)
ORAL_HYGIENE: INDEPENDENT
LAUNDRY: WITH SUPERVISION
HYGIENE/GROOMING: INDEPENDENT
ORAL_HYGIENE: INDEPENDENT
DRESS: STREET CLOTHES;INDEPENDENT
HYGIENE/GROOMING: INDEPENDENT;SHOWER
DRESS: INDEPENDENT
LAUNDRY: WITH SUPERVISION

## 2022-04-13 NOTE — PROGRESS NOTES
"    ----------------------------------------------------------------------------------------------------------  [facility]  Psychiatric Progress Note  Hospital Day #1    iSlvia Tidwell MRN# [id number]   Age: 32 year old YOB: 1990   Date of Admission: 4/11/2022     Subjective   The patient was discussed with the treatment team and chart notes were reviewed.      Identifier: Silvia Tidwell is a 32 year old female with a past psychiatric history of severe and recurrent depressive disorder, anxiety and PTSD who was admitted from the  ER on 04/12/2022 due to concern for SI in the context  of multiple recent  inadequate medication trials, and multiple psychosocial stressors including history and recent psychiatric hospitalizations. Substance use does not appear being a contributing factor in presentation.  Other psychosocial stressors including loss, trauma, chronic mental health issues, family dynamics and medical issues. Patient is on hospital day #1.     Sleep:  4.5 hours (04/13/22 0600)  Prescribed Medications: Taken as prescribed   PRN Psychiatric Medications:     Tylenol    Melatonin    Nicotine replacement therapy (Gum)    Overnight Nursing Notes/Staff Report:  \"Patient up and visible on the unit most part of he shift. Engaged and social with select peers, watching TV, attended group, endorsed anxiety and depression, denies all other psych symptoms, complained of generalized body pain, medicated patient with PRN tylenol. \"    \"Silvia presented as calm and pleasant. She engaged in group discussion about self-compassion and was hyper verbal as she talked about her experiences with practicing positive affirmations. \"    \" Checked patients CPAP settings. Unable to change hospital's CPAP settings at the time, pt decided to use her own CPAP with home settings for the night.\"    Patient interview:  Silvia Tidwell states that she feels \"tired\" today since she was not able to sleep " well las night due to issues with CPAP machine. Mentions had brain imaging study done and it went well. Reports experiencing some anxiety last evening and was offered hydroxyzine which has worked in the past but was hesitant to take due to multiple adverse event from multiple medications recently.  Appetite is ok and reports no other symptoms besides low mood and anxiety. Brain MRI discussed with patient who understands results are not concerning acute intracranial pathology and evidence of paranasal sinus mucosal thickening could be compatible with acute sinusitis.   TIP Skills discussed with patient and also importance of discharge planning once workup is completed. Internal Medicine to see patient today.  Patient amenable to this plan.     Patient was noted as pleasant and cooperative, mood is improved and affect congruent with mood and somewhat anxious. Some splitting regarding last evening staff/respiratory therapy.  Denies tremors, diaphoresis/sweatings, palpitations, dry wretching/nausea/vomitind, muscle pain/stiffness or other perceptual changes indicating withdrawal.  No focal neurological deficits noticed or reported.          ROS   ROS was negative unless noted above.     Objective   Vitals:  Temp: 98  F (36.7  C) (Temp  Min: 98  F (36.7  C)  Max: 98  F (36.7  C))  Resp: 16 (Resp  Min: 16  Max: 16)  SpO2: 99 % (SpO2  Min: 99 %  Max: 99 %)  Pulse: 54 (Pulse  Min: 54  Max: 54)  BP: 134/82  Systolic (24hrs), Av , Min:134 , Max:134   Diastolic (24hrs), Av, Min:82, Max:82    Mental Status Examination:  Oriented to:  Grossly Oriented, Person/Self, Situation and Date  General: Awake and Alert  Appearance:  appears stated age  Behavior:  cooperative and engaged, somewhat anxious and irritable   Eye Contact:  good  Psychomotor:  no abnormal motor symptoms appreciated; no catatonia present  Speech:  appropriate volume/tone, talkative and with good articulation  Language: Fluent in English with appropriate  "syntax and vocabulary.  Mood:  \"tired\"  Affect:  congruent with mood, exaggerated and irritable  Thought Process:  coherent, goal directed and circumstantial  Thought Content: suicidal ideation (passive), No HI, No VH and No AH; No apparent delusions  Associations:  intact  Insight:  good due to acknowledging MH symptoms and needing help   Judgment:  fair due to reaching out for help and being in the hospital voluntarily   Attention Span/ Concentration:   Adequate during twenty minute conversation    Recent and Remote Memory:  Adequate during twenty minute conversation    Fund of Knowledge: Consistent with level of formal education      Allergies     Allergies   Allergen Reactions     Adhesive Tape Rash     Irritation, burning     Amoxicillin Nausea and Vomiting     Eggs [Chicken-Derived Products (Egg)] Nausea     Can have eggs when baked in things     Flagyl [Metronidazole] Nausea and Vomiting and Diarrhea        Labs & Imaging     Results for orders placed or performed during the hospital encounter of 04/11/22 (from the past 24 hour(s))   Extra Tube    Narrative    The following orders were created for panel order Extra Tube.  Procedure                               Abnormality         Status                     ---------                               -----------         ------                     Extra Green Top (Lithium...[[id number]]                      Final result                 Please view results for these tests on the individual orders.   Extra Green Top (Lithium Heparin) Tube   Result Value Ref Range    Hold Specimen [facility]    Extra Tube    Narrative    The following orders were created for panel order Extra Tube.  Procedure                               Abnormality         Status                     ---------                               -----------         ------                     Extra Purple Top Tube[[id number]]                            Final result                 Please view results for these " tests on the individual orders.   Extra Purple Top Tube   Result Value Ref Range    Hold Specimen [facility]    MR Brain w/o Contrast    Narrative    MR BRAIN W/O CONTRAST 4/12/2022 6:29 PM    Provided History: Mental status change, unknown cause; Silvia Tidwell  is a 32 year old female with a past psychiatric history of severe and  recurrent depressive disorder, anxiety and PTSD who was admitted from  the  ER on 04/12/2022 due to concern for SI and worsening executive  functioning    Comparison:  None     Technique: Sagittal T1-weighted, coronal T2-weighted, axial T2 FLAIR,  axial susceptibility weighted, and axial diffusion-weighted with ADC  map images of the brain were obtained without intravenous contrast.    Findings: No intracranial mass lesion, mass effect, midline shift, or  abnormal fluid collection. The ventricles and sulci are normal for  age. No abnormality of reduced diffusion.  Normal intravascular flow  voids. No evidence of intracranial hemorrhage on  susceptibility-weighted images.    Moderate mucosal thickening of the right greater than left maxillary  sinuses, ethmoid air cells, and right frontal sinuses. Missing  maxillary and mandibular teeth.      Impression    Impression:  1. No acute intracranial pathology.  2. Paranasal sinus mucosal thickening could be compatible with acute  sinusitis.    I have personally reviewed the examination and initial interpretation  and I agree with the findings.    SAKINA ESTRELLA MD         SYSTEM ID:  [id number]        Assessment   Silvia Tidwell is a 32 year old female with a past psychiatric history of depressive disorder, anxiety, PTSD and possible borderline personality disorder who presented to the ED on 4/11 with SI in the context of multiple recent  inadequate medication trials, and multiple psychosocial stressors including history and recent psychiatric hospitalizations. Substance use does not appear being a contributing factor in presentation.  Other  psychosocial stressors including loss, trauma, chronic mental health issues, family dynamics and medical issues.  . Significant symptoms include SI, irritable, depressed, mood lability, sleep issues and poor frustration tolerance. Her last psychiatric hospitalization was in 3/22.  She is currently followed by  Baldev Hood MD at [facility]. Current psychosocial stressors include romantic issues, loss, trauma, chronic mental health issues, family dynamics and medical issues which she has been coping with by withdrawing, acting out to self and acting out to others.  Patient's support system includes family and outpatient team [facility].  Substance use does not appear to be playing a contributing role in the patient's presentation.  There is genetic loading for psychosis and CD. Medical history does appear to be significant for obesity, Vitamin D deficiency and thyroid disorder.  Psychologically history of trauma and maladaptive coping mechanisms seems to be bth precipitating and perpetuating factors in presentation. The MSE is notable for Mood dysregulation, poor distress tolerance, dramatic affect. She denies self injurious behaviors.  Her definitive diagnosis is still in evolution; differential includes MDD, complex PTSD, borderline personality disorder.  Additionally, she has traits of borderline personality disorder which interfere with her ability to adhere with the treatment plan.  Optimization of medications to target these symptom clusters in addition to evaluation of adquate outpatient supports will be targets for treatment during this admission.      Given that she currently has SI and out of control behaviors, patient warrants inpatient psychiatric hospitalization to maintain her safety. Disposition pending clinical stabilization, medication optimization and development of an appropriate discharge plan.     Risk for harm is elevated.  Risk factors: SI, maladaptive coping, trauma, family dynamics and past  "behaviors  Protective factors: family and engaged in treatment, 5 and 10 yo children     Principal psychiatric diagnosis:   - Depressive disorder recurrent with SI      Secondary psychiatric diagnoses:   -PTSD     Psychiatric course:  Silvia Tidwell was admitted to [facility] as a voluntary patient. Her PTA medications were reviewed &  Sertraline, Duloxetine were discontinued during last hospital admission and patient not taking Citalopram, Buspirone which were also not continued. Lorazepam was held due to patient not taking regularly and causing severe rebound anxiety per patient report. 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 MRI results were negative for acute intracranial pathology and evidenced paranasal sinus epithelium thickening indicating acute sinusitis.     Silvia Tidwell continued to meet criteria for inpatient hospitalization medication optimization, inpatient stabilization, and appropriate discharge planning.     Medical course:   Silvia Tidwell was physically examined by the ED prior to being transferred to the unit and was found to be medically stable and appropriate for admission.   4/11 pt reported ongoing urinary symptoms and concerns for vaginal/STI infection and paranasal sinus \"presure\" headache, recent weight changes and hair loss. Internal Medicine consult was placed.      Plan   Today's Changes:     F/U Internal Medicine consult.   _______________________________________________________________________  Psychiatric diagnoses and management:  Principal Diagnosis:   - Depressive disorder recurrent with SI   o Hold antidepressants while completing workup   o Reassess tomorrow    Secondary Psychiatric Diagnoses:     PTSD    Additional Planning:    Continue to monitor for and stabilize: SI, irritable, mood lability, sleep issues and poor frustration tolerance    Patient will be treated in " therapeutic milieu with appropriate individual and group therapies as described.    Scheduled Medications (summary):    cyanocobalamin  1,000 mcg Oral Daily     vitamin D3  125 mcg Oral Daily       PRN Medications (summary):  acetaminophen, alum & mag hydroxide-simethicone, hydrOXYzine, ibuprofen, [Held by provider] LORazepam, melatonin, nicotine, OLANZapine **OR** OLANZapine, polyethylene glycol    Discontinued Medications (& Rationale):  Sertraline, Duloxetine. Discontinued during last hospital admission and patient not taking Citalopram, Buspirone    Consults:  - IM consult placed for UTI symptoms and sinusitis despite rcent antibiotic treatment. Recs for further thyroid function due to concerns for weight loss, hair loss.     Legal Status:    Voluntary     SIO:    Has required one this admission for CPAP machine use, per [facility] unit policy    Pt has not required locked seclusion or restraints in the past 24 hours to maintain safety, please refer to RN documentation for further details.    Disposition:    TBD, pending clinical stabilization, medication optimization, and formulation of a safe discharge plan.    Safety Assessment:   Behavioral Orders   Procedures     Code 1 - Restrict to Unit     Code 2     For head/brain MRI     Code 2     Okay to go to MRI     Discontinue 1:1 attendant for suicide risk     Order Specific Question:   I have performed an in person assessment of the patient     Answer:   Based on this assessment the patient no longer requires a one on one attendant at this point in time.     Order Specific Question:   Rationale     Answer:   Routine observations are sufficient to monitor safety.     Order Specific Question:   Rationale     Answer:   Modifications to care environment made to mitigate safety risk     Order Specific Question:   Rationale     Answer:   Patient States able to remain safe in hospital     Routine Programming     As clinically indicated     Self Injury Precaution     Status 15      Every 15 minutes.     Status Individual Observation     Patient SIO status reviewed with team/RN.  Please also refer to RN/team documentation for add'l detail.    -SIO staff to monitor following which have contributed to patient being on SIO:  Patient using home CPAP machine at night  -Possible interventions SIO staff could use to support patient's treatment progress:  Observe patient using CPAP machine at night  -When following observed, team will review discontinuation of SIO:  SIO not needed during the day, when patient is not sleeping.     Order Specific Question:   CONTINUOUS 24 hours / day     Answer:   5 feet     Order Specific Question:   Indications for SIO     Answer:   Medical equipment / ligature risk     Suicide precautions     Patients on Suicide Precautions should have a Combination Diet ordered that includes a Diet selection(s) AND a Behavioral Tray selection for Safe Tray - with utensils, or Safe Tray - NO utensils        ____________________________________________________________________  Pertinent Medical diagnoses and management:  o Possible sinusitis, UTI, ?long-COVID Syndrome Internal Medicine to see patient   ____________________________________________________________________  Patient seen and discussed with attending physician, Dr. Selin M.D. who is in agreement with my assessment and plan.    Be Covarrubias MD  PGY-1 Psychiatry Resident    Attending Attestation:    I, Binta Smallwood M.D., saw and evaluated the patient with the resident physician. I have reviewed all labs and vital signs. I have reviewed the patient's care with the multidisciplinary treatment team, both before and after the interview. I have reviewed the above progress note, which reflects the team's treatment plan appropriately.

## 2022-04-13 NOTE — PLAN OF CARE
"   04/13/22 1443   Group Therapy Session   Group Attendance attended group session   Time Session Began 1310   Time Session Ended 1410   Total Time patient participated (minutes) 50   Total # Attendees 4   Group Type task skill;life skill   Group Topic Covered coping skills/lifestyle management   Group Session Detail coping skill exploration, creative expression within personally meaningful activities, and observation of social, cognitive, and kinesthetic performance skills.     Patient Response/Contribution cooperative with task;organized   Patient Response Detail IND to initiate, gather materials, sequence and follow through with simple task demands. Demonstrated fair focus, planning, and problem solving within task. Able to ask for assistance and appeared comfortable interacting with peers and staff. Reported difficulty with SO at home as stated \"I'm realize how toxic my relationship with him is because of his alcoholism. It's hard to know what to do though.. it's scary to leave\".      "

## 2022-04-13 NOTE — CONSULTS
"[facility]  Consult Note - Hospitalist Service  Date of Admission:  4/11/2022  Consult Requested by: Dr. Tyler Carrillo  Reason for Consult: \"Patient with recent UTI/Sinusitis treated with antibiotics, currently reporting urinary symptoms and concerned for vaginal/STI infection. Recent weight loss, hair loss and evaluation for further thyroid studies.\"    Assessment & Plan   Silvia Tidwell is a 32 year old female with a history of anxiety and depression who was admitted to [facility] on 4/11/2022 for concern over SI. Medicine was consulted as patient with concerns for UTI, vaginitis and sinusitis.    Acute Sinusitis  MR brain without contrast done for mental status change of unknown cause. Revealed paranasal sinus mucosal thickening. Patient reports one month of ongoing nasal congestion, pressure and headache. Was diagnosed 4/6 with sinusitis and started on Augmentin, which did not help. Reports no medications, including nasal spray, seem to help her symptoms.   - Start doxycycline 100 mg BID for 7 days  - Start probiotic  - Follow up with PCP in 3-5 after discharge to ensure symptom resolution    Urinary Symptoms  Patient reports ongoing burning with urination, frequency, and foul smelling urine for the last several days. Endorse recent UTI diagnosis. Per chart review, patient did have recent E. Coli UTI 3/31 and was started on Macrobid, which she did not complete due to concern for medication side effects (sensitivies show this would have been acceptable). Patient hemodynamically stable and non-toxic appearing. Mild suprapubic tenderness, but no CVA tenderness. Repeat UA was ordered given patient has been off and on antibiotics--showed not acute UTI. Unclear etiology behind urinary symptoms.  - Follow up with PCP in 3-5 after discharge to ensure symptom resolution    Vaginal Pruiritus  Patient reports recent antibiotic use for UTI. Endorses a couple day " of severe vaginal itching that feels like a yeast infection. States she is prone to this and has had many in the past. Usually requires two doses of fluconazole. Wet prep negative.  - Follow up with PCP in 3-5 for exam and further work-up  - Topical hydrocortisone 0.5% provided for symptom relief--please only use for three days and then follow up with PCP    Weight Loss  Hair Loss  Patient with weight loss and hair loss. TSH returned within normal range.  - Follow up with PCP on discharge for further work-up and investigation     The patient's care was discussed with the Bedside Nurse and Patient.    Medicine will sign off now. Thank you for allowing us to be a part of this patient's care.     Brandi Walls PA-C  [facility]  Securely message with the Vocera Web Console (learn more here)  Text page via [facility] [employer]       Hospitalist Service    Clinically Significant Risk Factors Present on Admission                 ______________________________________________________________________    Chief Complaint   UTI, Sinusitis    History is obtained from the patient and patient's chart    History of Present Illness   Silvia Tidwell is a 32 year old female with a history of anxiety and depression who was admitted to [facility] on 4/11/2022 for concern over SI. Medicine was consulted as patient with concerns for UTI, vaginitis and sinusitis.    Patient reports worsening urinary frequency, burning with urination, foul smelling urine and vaginal itching for a few days. Feels like she has a UTI and yeast infection. Has had both I the past and this is what they feel like. Also complains of severe headache, congestion consistent with sinusitis. Would like to try a new antibiotic for her symptoms with probiotic, as she is prone to get yeast infection. States the antibiotics she has been taking have not worked. Otherwise denies chills, fever, vision  changes, back pain.     Review of Systems   The 5 point Review of Systems is negative other than noted in the HPI or here.     Past Medical History    I have reviewed this patient's medical history and updated it with pertinent information if needed.   Past Medical History:   Diagnosis Date     Anxiety      Cancer (H) 2012    Glandular     Depressive disorder      Pancreatitis 03/2014       Past Surgical History   I have reviewed this patient's surgical history and updated it with pertinent information if needed.  History reviewed. No pertinent surgical history.    Social History   I have reviewed this patient's social history and updated it with pertinent information if needed.  Social History     Tobacco Use     Smoking status: Current Every Day Smoker     Packs/day: 1.00     Types: Cigarettes     Smokeless tobacco: Never Used   Substance Use Topics     Alcohol use: Yes     Comment: rarely     Drug use: No       Family History   No significant family history.    Medications   I have reviewed this patient's current medications    Allergies   Allergies   Allergen Reactions     Adhesive Tape Rash     Irritation, burning     Amoxicillin Nausea and Vomiting     Eggs [Chicken-Derived Products (Egg)] Nausea     Can have eggs when baked in things     Flagyl [Metronidazole] Nausea and Vomiting and Diarrhea       Physical Exam   Vital Signs: Temp: 98  F (36.7  C) Temp src: Oral BP: 134/82 Pulse: 54   Resp: 16 SpO2: 99 % O2 Device: None (Room air)    Weight: 245 lbs 0 oz    General Appearance: Comfortable appearing female seen in room and ambulating on unit, talking to peers and staff.  Eyes: PERRLA.  HEENT: Atraumatic.  Respiratory: CTA throughout.  Cardiovascular: RRR.  GI: Mild suprapubic tenderness.  Genitourinary: No CVA tenderness bilaterally.  Skin: Right AC with minimal bruising, no erythema, swelling, or warmth.  Musculoskeletal: Moving all extremities spontaneously.  Neurologic: CN II-XII grossly intact.    Data    UA 4/13/2022  UA RESULTS:  Recent Labs   Lab Test 04/13/22  1602 04/11/22  1607   COLOR Straw Straw   APPEARANCE Clear Clear   URINEGLC Negative Negative   URINEBILI Negative Negative   URINEKETONE Negative Negative   SG 1.005 1.003   UBLD Negative Moderate*   URINEPH 6.5 6.5   PROTEIN Negative Negative   NITRITE Negative Negative   LEUKEST Negative Negative   RBCU  --  3*   WBCU  --  3     Wet Prep 4/13/2022  1+ Wbcs/high power field, but no trichomonas, yeast or clue cells

## 2022-04-13 NOTE — PLAN OF CARE
Assessment/Intervention/Current Symtoms and Care Coordination  The patient's care was discussed with the treatment team and chart notes were reviewed.   Patient met with team.  Patient has continued to have many requests.  Awaiting internal med consult, but thus far all results from MRI/blood work have been normal.  Patient has attended groups. Social with peers.  No CTC needs today.  Patient has outpatient care in place.    Discharge Plan or Goal  Patient will return home when stable.  Patient will resume care with outpatient providers - appts in place    Barriers to Discharge   Ongoing evaluation  Depressive sx's, anxiety sx's    Referral Status  None today    Legal Status     Voluntary

## 2022-04-13 NOTE — PROGRESS NOTES
"RT was called by RN to check patients CPAP settings. Unable to change hospital's CPAP settings at the time, pt decided to use her own CPAP with home settings for the night. Pt expresses interest on speaking to a doctor about another sleep study to properly adjust home settings. PT claims that setting is not strong enough and feels as though she still has \"apenic periods\". RN informed and will follow up with doctors.  "

## 2022-04-13 NOTE — PLAN OF CARE
04/12/22 2100   Group Therapy Session   Group Attendance attended group session   Time Session Began 2000   Time Session Ended 2100   Total Time patient participated (minutes) 60   Total # Attendees 5   Group Type expressive therapy  (art therapy)   Group Topic Covered emotions/expression   Group Session Detail self-compassion   Patient Response/Contribution cooperative with task;organized;discussed personal experience with topic   Patient Response Detail Silvia presented as calm and pleasant. She engaged in group discussion about self-compassion and was hyper verbal as she talked about her experiences with practicing positive affirmations. Writer presented information on self-compassion and Silvia participated in making a drawing representing compassion. She appeared calm and focused while drawing a digna flower and writing several positive self-compassion messages for herself. She shared with the group and talked at length about her personal story.   Goal Outcome Evaluation:

## 2022-04-13 NOTE — PLAN OF CARE
"   04/13/22 1220   Group Therapy Session   Group Attendance attended group session   Time Session Began 1110   Time Session Ended 1200   Total Time patient participated (minutes) 50   Total # Attendees 4   Group Type life skill   Group Topic Covered coping skills/lifestyle management   Patient Response/Contribution cooperative with task;discussed personal experience with topic   Patient Response Detail congruent affect. fully engaged in coping skill worksheet and contributed to group discussion. reported >10 personally meaningful coping skills and stated \"I have/know a lot more than I realized\".     "

## 2022-04-13 NOTE — PLAN OF CARE
"Pt has had many requests throughout this shift. She has presented as tense, irritable and highly anxious at times. She will make multiple requests at once, some of which limitations have already been set around and/or repeated questions/concerns to different staff that have already been addressed or are in the process of being resolved. Pt care order has been placed specifically stating which items pt is allowed to have from her belongings. Writer provided pt with these items today. Ongoing c/o headache, which she attributes to a \"sinus infection.\" Pt to be seen by internal medicine today for this, along with other physical complaints/symptoms. She appears to have little insight into her mental health symptoms and overall judgment also appears limited. No current active suicidal ideation or any other safety concerns.     Problem: Plan of Care - These are the overarching goals to be used throughout the patient stay.    Goal: Plan of Care Review/Shift Note  Description: The Plan of Care Review/Shift note should be completed every shift.  The Outcome Evaluation is a brief statement about your assessment that the patient is improving, declining, or no change.  This information will be displayed automatically on your shift note.  Outcome: Ongoing, Progressing  Flowsheets  Taken 4/13/2022 1402  Plan of Care Reviewed With: patient  Taken 4/13/2022 1351  Plan of Care Reviewed With: patient     "

## 2022-04-13 NOTE — PLAN OF CARE
Pt slept 4.5 hours tonight. Pt was uop few times. Pt did not like using either her own cpap or the hospital CPAP stating it did not have enough pressure and was still experiencing apnea. Respiratory therapist was notified and came to assess the situation. RT stated she could not adjust the settings. She stated the patient might need a sleep study to determine how the pressure.    Problem: Sleep Disturbance  Goal: Adequate Sleep/Rest  Outcome: Ongoing, Progressing   Goal Outcome Evaluation:

## 2022-04-14 VITALS
SYSTOLIC BLOOD PRESSURE: 124 MMHG | BODY MASS INDEX: 39.09 KG/M2 | OXYGEN SATURATION: 96 % | HEART RATE: 60 BPM | TEMPERATURE: 97.8 F | WEIGHT: 242.2 LBS | DIASTOLIC BLOOD PRESSURE: 79 MMHG | RESPIRATION RATE: 16 BRPM

## 2022-04-14 PROBLEM — K30 MILD DIETARY INDIGESTION: Status: ACTIVE | Noted: 2022-04-14

## 2022-04-14 PROBLEM — L29.2 VULVAR PRURITUS: Status: ACTIVE | Noted: 2022-04-14

## 2022-04-14 PROBLEM — J01.90 ACUTE SINUSITIS WITH SYMPTOMS > 10 DAYS: Status: ACTIVE | Noted: 2022-04-14

## 2022-04-14 PROCEDURE — 250N000013 HC RX MED GY IP 250 OP 250 PS 637

## 2022-04-14 PROCEDURE — 90853 GROUP PSYCHOTHERAPY: CPT

## 2022-04-14 PROCEDURE — 250N000013 HC RX MED GY IP 250 OP 250 PS 637: Performed by: STUDENT IN AN ORGANIZED HEALTH CARE EDUCATION/TRAINING PROGRAM

## 2022-04-14 PROCEDURE — G0177 OPPS/PHP; TRAIN & EDUC SERV: HCPCS

## 2022-04-14 PROCEDURE — 250N000013 HC RX MED GY IP 250 OP 250 PS 637: Performed by: PSYCHIATRY & NEUROLOGY

## 2022-04-14 PROCEDURE — 99238 HOSP IP/OBS DSCHRG MGMT 30/<: CPT | Mod: GC | Performed by: PSYCHIATRY & NEUROLOGY

## 2022-04-14 RX ORDER — DOXYCYCLINE 100 MG/1
100 CAPSULE ORAL EVERY 12 HOURS
Qty: 14 CAPSULE | Refills: 0 | Status: SHIPPED | OUTPATIENT
Start: 2022-04-14 | End: 2022-04-21

## 2022-04-14 RX ORDER — DIAPER,BRIEF,INFANT-TODD,DISP
EACH MISCELLANEOUS 2 TIMES DAILY
Qty: 15 G | Refills: 0 | Status: SHIPPED | OUTPATIENT
Start: 2022-04-14 | End: 2022-04-17

## 2022-04-14 RX ADMIN — ACETAMINOPHEN 650 MG: 325 TABLET ORAL at 07:25

## 2022-04-14 RX ADMIN — Medication 125 MCG: at 08:34

## 2022-04-14 RX ADMIN — IBUPROFEN 400 MG: 400 TABLET, FILM COATED ORAL at 08:12

## 2022-04-14 RX ADMIN — IBUPROFEN 400 MG: 400 TABLET, FILM COATED ORAL at 16:18

## 2022-04-14 RX ADMIN — ACETAMINOPHEN 650 MG: 325 TABLET ORAL at 12:55

## 2022-04-14 RX ADMIN — NICOTINE POLACRILEX 2 MG: 2 GUM, CHEWING BUCCAL at 08:34

## 2022-04-14 RX ADMIN — CYANOCOBALAMIN TAB 1000 MCG 1000 MCG: 1000 TAB at 08:34

## 2022-04-14 ASSESSMENT — ACTIVITIES OF DAILY LIVING (ADL)
LAUNDRY: WITH SUPERVISION
DRESS: INDEPENDENT
ORAL_HYGIENE: INDEPENDENT
HYGIENE/GROOMING: INDEPENDENT

## 2022-04-14 NOTE — PLAN OF CARE
Problem: Behavioral Health Plan of Care  Goal: Patient-Specific Goal (Individualization)  Outcome: Ongoing, Not Progressing     Problem: Behavioral Health Plan of Care  Goal: Adheres to Safety Considerations for Self and Others  Outcome: Ongoing, Progressing  Intervention: Develop and Maintain Individualized Safety Plan  Recent Flowsheet Documentation  Taken 4/14/2022 1240 by Renetta Guerrero RN  Safety Measures: environmental rounds completed     Problem: Behavioral Health Plan of Care  Goal: Absence of New-Onset Illness or Injury  Outcome: Ongoing, Progressing  Intervention: Identify and Manage Fall Risk  Recent Flowsheet Documentation  Taken 4/14/2022 1240 by Renetta Guerrero RN  Safety Measures: environmental rounds completed   Goal Outcome Evaluation:    Plan of Care Reviewed With: patient     Pt feels suicidal, hopeless and feels some aggression (not directed to anyone). At the beginning of the shift, pt requested for anxiety medication other than Hydroxyzine, Pt stated that the antibiotics and Hydroxyzine are giving her rebound anxiety. Pt was tearful, feels hopeless with the medication side effects. Pt complained of headache in the morning rating it at 7/10 and was given ibuprofen which was effective but at about 1 PM, pt stated that the headache is going up again. PRN Tylenol requested/given. Pt refused scheduled Doxycycline, culturelle and hydrocortisone cream. Team notified. Pt inquired about wet prep and MRI results, information provided.  Encourage pt to speak with the team about her medications and other lab results. Pt continues to refuse medications earlier declined despite education and encouragement. Pt able to go to groups in the morning. Pt now sleeping in her room. Will continue to monitor.

## 2022-04-14 NOTE — PLAN OF CARE
04/14/22 1528   Group Therapy Session   Group Attendance attended group session   Time Session Began 1445   Time Session Ended 1520   Total Time patient participated (minutes) 35   Total # Attendees 6   Group Type psychotherapeutic   Group Topic Covered anger/conflict management;emotions/expression   Group Session Detail Discuss definations of forgiveness and self-forgiveness; Explore the phases and process of forgiveness.   Patient Response/Contribution did not discuss personal experience;listened actively   Patient Response Detail Patient attended group however was quite and participated minimally.

## 2022-04-14 NOTE — PLAN OF CARE
Problem: Sleep Disturbance  Goal: Adequate Sleep/Rest  Outcome: Ongoing, Progressing       Pt appeared to have slept for 6.75 hours. No prn medication was administered this shift.  Pt was on SIO for the use of CPAP during the night shift. No concern noted or reported. Staff will continue to offer support to pt.

## 2022-04-14 NOTE — PROGRESS NOTES
04/13/22 2100   Group Therapy Session   Group Attendance attended group session   Time Session Began 2000   Time Session Ended 2100   Total Time patient participated (minutes) 60   Total # Attendees 4   Group Topic Covered emotions/expression   Patient Response/Contribution cooperative with task   Pt attended 60 minutes of group this evening. Pts were encouraged to try a mindfulness technique by creating a response to a recent pleasant emotional experience. Pts used watercolors and also had the choice of expression through creative writing  Goals of directive: mindfulness, DBT skills-emotional regulation, distress tolerance  Pt was engaged in watercolor painting for the majority of group. Pt finished painting and poem and briefly shared with author and group. Pt created an image of her house and family and talked about missing her children and the close relationships she has with her children. Pt also referred to feeling overwhelmed by day to day household and  tasks and said that she has no time for herself. Pt identified this as something she would like to work on. Pt did not identify any positive support systems.  Pts mood was depressed, referred to feeling hopeless about the future.

## 2022-04-14 NOTE — PROGRESS NOTES
Pt participated in dance/movement therapy (D/MT) with stretching and increasing creative personal expression.  She stated that opening her body through stretching made her feel like moving more.  She was called out early for other hospital services.       04/13/22 1030   Group Therapy Session   Group Attendance attended group session   Total Time patient participated (minutes) 30   Group Type psychotherapeutic   Group Topic Covered balanced lifestyle;relaxation techniques;self-care activities

## 2022-04-14 NOTE — PLAN OF CARE
Pt was visible in the milieu and social with selective peers. Presented with flat affect but brightened upon approach. Pt denied. Pt was watching movies for awhile in the lounge. Pt was started on antibiotics this evening and later reported that she felt like it was causing her to have a panic attack. Prn Hydroxyzine was administered for anxiety 7/10. Pt reported minimum relief. Pt was offered a waited blanket.     Problem: Sleep Disturbance  Goal: Adequate Sleep/Rest  Outcome: Ongoing, Progressing     Problem: Behavioral Health Plan of Care  Goal: Adheres to Safety Considerations for Self and Others  Outcome: Ongoing, Progressing     Problem: Suicidal Behavior  Goal: Suicidal Behavior is Absent or Managed  Outcome: Ongoing, Progressing   Goal Outcome Evaluation:

## 2022-04-14 NOTE — PLAN OF CARE
Assessment/Intervention/Current Symtoms and Care Coordination  The patient's care was discussed with the treatment team and chart notes were reviewed.   Patient met with team.  Patient continues to report depression/anxiety sx's.  She will start new medication today.  No CTC needs today.  Patient has outpatient care in place.     Discharge Plan or Goal  Patient will return home when stable.  Patient will resume care with outpatient providers - appts in place     Barriers to Discharge   Ongoing evaluation  Depressive sx's, anxiety sx's     Referral Status  None today     Legal Status     Voluntary

## 2022-04-14 NOTE — PROGRESS NOTES
Behavioral Health  Note   Behavioral Health  Spirituality Group Note     [address]    Name: Silvia Tidwell    YOB: 1990   MRN: [id number]    Age: 32 year old     Patient attended -led group, which included discussion of spirituality, coping with illness and building resilience.   Patient attended group for 1.0 hrs.   The patient actively participated in group discussion     Briannamsana laura [facility]  Staff    Page [phone]

## 2022-04-14 NOTE — DISCHARGE SUMMARY
"    Psychiatric Discharge Summary    Hospital Day #2    Silvia Tidwell MRN# [id number]   Age: 32 year old YOB: 1990     Date of Admission:  4/11/2022  Date of Discharge:  4/14/2022  Admitting Physician:  Lilibeth Carrillo MD  Discharge Physician:  Lilibeth Carrillo MD         Event Leading to Hospitalization:   \"\"Silvia Tidwell is a 32 year old female who is here accompanied by mother and daughter. Patient has a long history of depression and has had multiple treatments and interventions including hospital stays at [facility] in early March 2022 and [facility] late March 2022. Patient reports feeling better temporarily. She reports that the meds are not helping and her psychiatric providers have given up on her. She has new psychiatric appointment on 4/28 and therapy services next May. In the meantime she has been feeling overwhelmed. 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 She sleeps poorly and is unable to relax. She reports dealing with a UTI and had been on antibiotics. She has started to feel helpless and hopeless and was thinking of \"blowing her brains out\" yesterday. Her doctor told her to consider [facility] here at [place] as ketamine treatment is provided here.  Patient denies drug use. She is considering starting CBD treatment to address her mood. She denies acute medical concerns and has been vaccinated against COVID. She denies any COVID symptoms presently.\"     She was medically cleared for admission to inpatient psychiatric unit.     Per DEC assessment:  \"Patients acute symptoms started in Feb. When her PCP noted she had low Vit. D, and patient was recommended to take 50,000 international unit(s) of Vitiam D and another a week later. She has since had sever anxiety attacks, multiple medication trials during to admissions in March, 3/2 at [facility] and 3/23 at [facility]. She is in the process of starting with a new psychiatrist on [place] [facility]. She has been seeing a temperary " "therapist with another appointment on 4/13/22 and a new permanent therapist May 3rd, all through [facility]. It has been recommended that she try DBT, acupuncture and EMDR, but has not gotten those in place.\"       See ED notes and Admission note by Dr. Gerardo MD on 4/11-4/12 for additional details.          Diagnoses:   #Primary Psychiatric Diagnosis  - Depressive disorder recurrent    #Secondary Psychiatric Diagnosis  PTSD  Likely borderline personality disorder     Diagnostic Impression:   Silvia Tidwell is a 32 year old female with a past psychiatric history of depressive disorder, anxiety, PTSD and possible borderline personality disorder who presented to the ED on 4/11 with SI in the context of multiple recent  inadequate medication trials, and multiple psychosocial stressors including history and recent psychiatric hospitalizations. Substance use does not appear being a contributing factor in presentation.  Other psychosocial stressors including loss, trauma, chronic mental health issues, family dynamics and medical issues, including concerns for long COVID infection. Significant symptoms include SI, irritable, depressed, mood lability, sleep issues and poor frustration tolerance. Her last psychiatric hospitalization was in 3/22.  She is currently followed by  Baldev Hood MD at [facility]. Current psychosocial stressors include romantic issues, loss, trauma, chronic mental health issues, family dynamics and medical issues which she has been coping with by withdrawing, acting out to self and acting out to others.  Patient's support system includes family and outpatient team [facility].  Substance use does not appear to be playing a contributing role in the patient's presentation.  There is genetic loading for psychosis and CD. Medical history does appear to be significant for obesity, Vitamin D deficiency and thyroid disorder.  Psychologically history of trauma and maladaptive coping mechanisms seems to be bth " precipitating and perpetuating factors in presentation. The MSE is notable for Mood dysregulation, poor distress tolerance, dramatic affect. She denies self injurious behaviors.  Her definitive diagnosis is still in evolution; differential includes MDD, complex PTSD, borderline personality disorder.  Additionally, she has traits of borderline personality disorder which interfere with her ability to adhere with the treatment plan.  Optimization of medications to target these symptom clusters in addition to evaluation of adquate outpatient supports were  targets for treatment during this admission.           Consults:   Internal Medicine, see medical course below.       Hospital Course:   Psychiatric Course:  Silvia Tidwell was admitted to [facility] as a voluntary patient. Her PTA medications were reviewed &  Sertraline, Duloxetine were discontinued during last hospital admission and patient not taking Citalopram, Buspirone which were also not continued. Lorazepam was held due to patient not taking regularly and causing severe rebound anxiety per patient report. 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 MRI results were negative for acute intracranial pathology and evidenced paranasal sinus epithelium thickening indicating acute sinusitis. Other labs part of workup included TSH, UA, wet prep, THS, Folate, Vitamin B12, lipid panel. On 4/14 low dose Lithium was offered and patient declined the medication at that time, made decision of working with outpatient providers instead   Symptom course including SI improved.     Medical Course and plan by problem:  Acute Sinusitis  MR brain without contrast done for mental status change of unknown cause. Revealed paranasal sinus mucosal thickening. Patient reports one month of ongoing nasal congestion, pressure and headache. Was diagnosed 4/6 with sinusitis and started on  "Augmentin, which did not help. Reports no medications, including nasal spray, seem to help her symptoms.   - Start doxycycline 100 mg BID for 7 days  - Start probiotic  - Follow up with PCP in 3-5 after discharge to ensure symptom resolution     Urinary Symptoms  Patient reports ongoing burning with urination, frequency, and foul smelling urine for the last several days. Endorse recent UTI diagnosis. Per chart review, patient did have recent E. Coli UTI 3/31 and was started on Macrobid, which she did not complete due to concern for medication side effects (sensitivies show this would have been acceptable). Patient hemodynamically stable and non-toxic appearing. Mild suprapubic tenderness, but no CVA tenderness. Repeat UA was ordered given patient has been off and on antibiotics--showed not acute UTI. Unclear etiology behind urinary symptoms.  - Follow up with PCP in 3-5 after discharge to ensure symptom resolution     Vaginal Pruiritus  Patient reports recent antibiotic use for UTI. Endorses a couple day of severe vaginal itching that feels like a yeast infection. States she is prone to this and has had many in the past. Usually requires two doses of fluconazole. Wet prep negative.  - Follow up with PCP in 3-5 for exam and further work-up  - Topical hydrocortisone 0.5% provided for symptom relief--please only use for three days and then follow up with PCP     Weight Loss  Hair Loss  Patient with weight loss and hair loss. TSH returned within normal range.  - Follow up with PCP on discharge for further work-up and investigation      Risk Assessment:      Today Silvia Tidwell denies SI, SIB and HI. No overt evidence of psychosis or zeus observed. Patient grossly appears to be cognitively intact. Insight and judgement have improved since admission. Patient reports  \"going through a lot right now\" though denies SI/intent saying \"I don't want to kill mysel, that's why I came here in the first place\" and contracts for " safety. Patient expresses understanding of MH illness and willingness to work with outpatient team. Patient has/has not exhibited aggressive or violence behaviors since prior to this admission. Throughout patient's stay they were engaged with treatment team and made needs known.  She has notable risk factors for self-harm, including anxiety, recent hospitalizations for SI, and recent loss of family member (aunt). However, risk is mitigated by commitment to family, sobriety, ability to volunteer a safety plan and history of seeking help when needed. Patient does not have immediate access to firearms. Therefore, based on all available evidence including the factors cited above, she does not appear to be at imminent risk for self-harm, does not meet criteria for a 72-hr hold, and therefore remains appropriate for ongoing outpatient level of care. Patient requested F/U appointments with MH providers which are in place and was strongly recommended pursuing DBT program through outpatient team. Patient agreed to further reduce risk of self-harm by F/u with outpatient team and agreed to remain medication adherent. Additional steps taken to minimize risk include: medication optimization, close psychiatric follow up and provision of crisis resources. Patient expressed understanding of risk associated with MH illness including increased risk of harm to self or others.     Silvia was released to home. During this admission, she did participate in groups and was visible in the milieu, and her symptoms of SI improved. At the time of discharge she was determined to not be a danger to herself or others.     This document serves as a transfer of care to Silvia Tidwell's outpatient providers.         Discharge Medications:     Current Discharge Medication List      CONTINUE these medications which have NOT CHANGED    Details   hydrOXYzine (VISTARIL) 25 MG capsule Take 25-50 mg by mouth every 8 hours as needed for anxiety (sleep)     "  LORazepam (ATIVAN) 0.5 MG tablet Take 0.5 mg by mouth 2 times daily as needed for anxiety      melatonin 3 mg, with vit B6 10 mg, 3-10 MG extended release tablet Take 3 mg by mouth nightly as needed for sleep      traZODone (DESYREL) 50 MG tablet Take 25 mg by mouth At Bedtime      vitamin B-12 (CYANOCOBALAMIN) 1000 MCG tablet Take 1,000 mcg by mouth in the morning.      !! vitamin D3 (CHOLECALCIFEROL) 125 MCG (5000 UT) tablet Take 5,000 Units by mouth daily      !! Vitamin D3 (CHOLECALCIFEROL) 125 MCG (5000 UT) tablet Take 125 mcg by mouth in the morning.       !! - Potential duplicate medications found. Please discuss with provider.               Psychiatric Examination:   Appearance:  no apparent distress, appears stated age and good hygiene  Attitude:  cooperative and engaged  Psychomotor:  no evidence of tics, dystonia, or tardive dyskinesia  Eye Contact: appropriate  Speech:  fluent English, normal tone and normal rate  Mood: \"Fine\"  Affect:  congruent with mood, somewhat irritable   Thought Content: denies suicidal ideation, denies homicidal ideation, no delusions elicited. Preoccupations regarding upper respiratory symptoms, concerns for ongoing UTI/vulvar symptoms.  Thought Process: coherent and goal directed  Sensorium: awake and alert  Cognition: memory grossly intact  Impulse control: good  Insight: good  Judgment: good         Discharge Plan:   Psychiatric Appointments:     Health Care Follow-up:   Appointment:  Psychiatrist: Baldev Hood MD: Intake appt. 4/28/22  [facility] [place]  [address] [place] [id number] [phone]               Permanent Therapist Leno Burciaga PsyD: first appt.  5/3/22  [address],  [place] [address] [phone]     Therapist: Temp. Therapist: Latha ELAINE next appt. 4/13/22  [address] [place] [address]  Phone: [phone]  Fax: [phone]     [facility] worker:  [facility],  [address],  [place] [address] "   [phone]      Pt seen and discussed with my attending physician, Dr. Lilibeth Carrillo M.D.   Be Covarrubias MD  PGY-1 Psychiatry Resident    Attestation:   The patient has been seen and evaluated by me,  Lilibeth Carrillo MD. I have examined the patient today and reviewed the discharge plan with the resident and medical student. I agree with the final assessment and plan, as noted in the discharge summary. I have reviewed today's vital signs, medications, labs and imaging.  Total time discharge plannin minutes  Lilibeth Carrillo MD               Appendix A: All Labs This Admission:     Results for orders placed or performed during the hospital encounter of 22   MR Brain w/o Contrast     Status: None    Narrative    MR BRAIN W/O CONTRAST 2022 6:29 PM    Provided History: Mental status change, unknown cause; Silvia Tidwell  is a 32 year old female with a past psychiatric history of severe and  recurrent depressive disorder, anxiety and PTSD who was admitted from  the  [facility] on 2022 due to concern for SI and worsening executive  functioning    Comparison:  None     Technique: Sagittal T1-weighted, coronal T2-weighted, axial T2 FLAIR,  axial susceptibility weighted, and axial diffusion-weighted with ADC  map images of the brain were obtained without intravenous contrast.    Findings: No intracranial mass lesion, mass effect, midline shift, or  abnormal fluid collection. The ventricles and sulci are normal for  age. No abnormality of reduced diffusion.  Normal intravascular flow  voids. No evidence of intracranial hemorrhage on  susceptibility-weighted images.    Moderate mucosal thickening of the right greater than left maxillary  sinuses, ethmoid air cells, and right frontal sinuses. Missing  maxillary and mandibular teeth.      Impression    Impression:  1. No acute intracranial pathology.  2. Paranasal sinus mucosal thickening could be compatible with acute  sinusitis.    I have  personally reviewed the examination and initial interpretation  and I agree with the findings.    SAKINA ESTRELLA MD         SYSTEM ID:  [id number]   HCG qualitative urine     Status: Normal   Result Value Ref Range    hCG Urine Qualitative Negative Negative   Drug abuse screen 1 urine (ED)     Status: Normal   Result Value Ref Range    Amphetamines Urine Screen Negative Screen Negative    Barbiturates Urine Screen Negative Screen Negative    Benzodiazepines Urine Screen Negative Screen Negative    Cannabinoids Urine Screen Negative Screen Negative    Cocaine Urine Screen Negative Screen Negative    Opiates Urine Screen Negative Screen Negative   UA with Microscopic reflex to Culture     Status: Abnormal    Specimen: Urine, Midstream   Result Value Ref Range    Color Urine Straw Colorless, Straw, Light Yellow, Yellow    Appearance Urine Clear Clear    Glucose Urine Negative Negative mg/dL    Bilirubin Urine Negative Negative    Ketones Urine Negative Negative mg/dL    Specific Gravity Urine 1.003 1.003 - 1.035    Blood Urine Moderate (A) Negative    pH Urine 6.5 5.0 - 7.0    Protein Albumin Urine Negative Negative mg/dL    Urobilinogen Urine Normal Normal, 2.0 mg/dL    Nitrite Urine Negative Negative    Leukocyte Esterase Urine Negative Negative    Bacteria Urine Few (A) None Seen /HPF    Mucus Urine Present (A) None Seen /LPF    RBC Urine 3 (H) <=2 /HPF    WBC Urine 3 <=5 /HPF    Squamous Epithelials Urine 1 <=1 /HPF    Narrative    Urine Culture not indicated   Asymptomatic COVID-19 Virus (Coronavirus) by PCR Nasopharyngeal     Status: Normal    Specimen: Nasopharyngeal; Swab   Result Value Ref Range    SARS CoV2 PCR Negative Negative, Testing sent to reference lab. Results will be returned via unsolicited result    Narrative    Testing was performed using the Xpert Xpress SARS-CoV-2 Assay on the  Cepheid Gene-Xpert Instrument Systems. Additional information about  this Emergency Use Authorization (EUA) assay can be  found via the Lab  Guide. This test should be ordered for the detection of SARS-CoV-2 in  individuals who meet SARS-CoV-2 clinical and/or epidemiological  criteria. Test performance is unknown in asymptomatic patients. This  test is for in vitro diagnostic use under the FDA EUA for  laboratories certified under CLIA to perform high complexity testing.  This test has not been FDA cleared or approved. A negative result  does not rule out the presence of PCR inhibitors in the specimen or  target RNA in concentration below the limit of detection for the  assay. The possibility of a false negative should be considered if  the patient's recent exposure or clinical presentation suggests  COVID-19. This test was validated by the [facility]. This laboratory is certified under  the Clinical Laboratory Improvement Amendments of 1988 (CLIA-88) as  qualified to perform high complexity laboratory testing.     Vitamin D     Status: Normal   Result Value Ref Range    Vitamin D, Total (25-Hydroxy) 30 20 - 75 ug/L    Narrative    Season, race, dietary intake, and treatment affect the concentration of 25-hydroxy-Vitamin D. Values may decrease during winter months and increase during summer months. Values 20-29 ug/L may indicate Vitamin D insufficiency and values <20 ug/L may indicate Vitamin D deficiency.    Vitamin D determination is routinely performed by an immunoassay specific for 25 hydroxyvitamin D3.  If an individual is on vitamin D2(ergocalciferol) supplementation, please specify 25 OH vitamin D2 and D3 level determination by LCMSMS test VITD23.     Extra Tube     Status: None    Narrative    The following orders were created for panel order Extra Tube.  Procedure                               Abnormality         Status                     ---------                               -----------         ------                     Extra Green Top (Lithium...[[id number]]                       Final result                 Please view results for these tests on the individual orders.   Extra Green Top (Lithium Heparin) Tube     Status: None   Result Value Ref Range    Hold Specimen JIC    Extra Tube     Status: None    Narrative    The following orders were created for panel order Extra Tube.  Procedure                               Abnormality         Status                     ---------                               -----------         ------                     Extra Purple Top Tube[[id number]]                            Final result                 Please view results for these tests on the individual orders.   Extra Purple Top Tube     Status: None   Result Value Ref Range    Hold Specimen JIC    Lipid panel     Status: Abnormal   Result Value Ref Range    Cholesterol 125 <200 mg/dL    Triglycerides 96 <150 mg/dL    Direct Measure HDL 37 (L) >=50 mg/dL    LDL Cholesterol Calculated 69 <=100 mg/dL    Non HDL Cholesterol 88 <130 mg/dL    Narrative    Cholesterol  Desirable:  <200 mg/dL    Triglycerides  Normal:  Less than 150 mg/dL  Borderline High:  150-199 mg/dL  High:  200-499 mg/dL  Very High:  Greater than or equal to 500 mg/dL    Direct Measure HDL  Female:  Greater than or equal to 50 mg/dL   Male:  Greater than or equal to 40 mg/dL    LDL Cholesterol  Desirable:  <100mg/dL  Above Desirable:  100-129 mg/dL   Borderline High:  130-159 mg/dL   High:  160-189 mg/dL   Very High:  >= 190 mg/dL    Non HDL Cholesterol  Desirable:  130 mg/dL  Above Desirable:  130-159 mg/dL  Borderline High:  160-189 mg/dL  High:  190-219 mg/dL  Very High:  Greater than or equal to 220 mg/dL   TSH with free T4 reflex and/or T3 as indicated     Status: Normal   Result Value Ref Range    TSH 1.31 0.40 - 4.00 mU/L   Vitamin B12     Status: Normal   Result Value Ref Range    Vitamin B12 639 193 - 986 pg/mL   Folate     Status: Normal   Result Value Ref Range    Folic Acid 17.5 >=5.4 ng/mL   Extra Tube     Status: None     Narrative    The following orders were created for panel order Extra Tube.  Procedure                               Abnormality         Status                     ---------                               -----------         ------                     Extra Purple Top Tube[[id number]]                            Final result                 Please view results for these tests on the individual orders.   Extra Purple Top Tube     Status: None   Result Value Ref Range    Hold Specimen JIC    UA reflex to Microscopic and Culture     Status: Normal    Specimen: Urine, Clean Catch   Result Value Ref Range    Color Urine Straw Colorless, Straw, Light Yellow, Yellow    Appearance Urine Clear Clear    Glucose Urine Negative Negative mg/dL    Bilirubin Urine Negative Negative    Ketones Urine Negative Negative mg/dL    Specific Gravity Urine 1.005 1.003 - 1.035    Blood Urine Negative Negative    pH Urine 6.5 5.0 - 7.0    Protein Albumin Urine Negative Negative mg/dL    Urobilinogen Urine Normal Normal, 2.0 mg/dL    Nitrite Urine Negative Negative    Leukocyte Esterase Urine Negative Negative    Narrative    Microscopic not indicated   Wet preparation     Status: Abnormal    Specimen: Vagina; Swab   Result Value Ref Range    Trichomonas Absent Absent    Yeast Absent Absent    Clue Cells Absent Absent    WBCs/high power field 1+ (A) None   Urine Drugs of Abuse Screen     Status: Normal    Narrative    The following orders were created for panel order Urine Drugs of Abuse Screen.  Procedure                               Abnormality         Status                     ---------                               -----------         ------                     Drug abuse screen 1 urin...[[id number]]  Normal              Final result                 Please view results for these tests on the individual orders.

## 2022-04-14 NOTE — PLAN OF CARE
04/14/22 1232   Group Therapy Session   Group Attendance attended group session   Time Session Began 1110   Time Session Ended 1200   Total Time patient participated (minutes) 45   Total # Attendees 6   Group Type task skill;life skill   Group Topic Covered coping skills/lifestyle management   Group Session Detail coping skill exploration, creative expression within personally meaningful activities, and observation of social, cognitive, and kinesthetic performance skills.     Patient Response/Contribution cooperative with task;organized   Patient Response Detail IND to initiate, gather materials, sequence and follow through with a moderately complex task demands. Demonstrated adequate focus, planning, and problem solving. Able to ask for assistance and appeared comfortable interacting with peers and staff.

## 2022-04-14 NOTE — PLAN OF CARE
Goal Outcome Evaluation: Adequate for discharge.    Pt ambulated off unit at 1720 with this writer and discharged to home via personal family car. She articulated understanding all discharge instructions including new abx and the importance of staying on schedule with abx. She understood to follow up with PCP re: vaginosis and possible UTI in addition to sinusitis. She understood all other follow up appointments and support for mental health including Psychiatry, therapy and other mental health workers. As per AVS discharge instructions, she is instructed to stop Ativan, but said she will likely take it to combat anxiety, which she said is exacerbated by HA and associated sinusitis. Pt denied SI upon discharge and said she would come back to an ED if needed. She was able to cite multiple coping strategies and a support network. Pt was otherwise calm, cooperative and appropriate. She left with all belongings accounted for and specific instructions for Rx.

## 2022-04-15 ENCOUNTER — PATIENT OUTREACH (OUTPATIENT)
Dept: CARE COORDINATION | Facility: CLINIC | Age: 32
End: 2022-04-15
Payer: COMMERCIAL

## 2022-04-15 DIAGNOSIS — Z71.89 OTHER SPECIFIED COUNSELING: ICD-10-CM

## 2022-04-15 NOTE — PROGRESS NOTES
Clinic Care Coordination Contact  [facility]: Post-Discharge   SITUATION                                                      Addendum 4/15/22: CC JONATHAN sent MyChart registration instructions via email provided from patient.    Admission:    Admission Date: 04/11/22   Reason for Admission: Suicide Ideation  Discharge:   Discharge Date: 04/14/22  Discharge Diagnosis: Suicide Ideation    BACKGROUND                                                      Silvia Tidwell is a 32 year old female with a past psychiatric history of depressive disorder, anxiety, PTSD and possible borderline personality disorder who presented to the ED on 4/11 with SI in the context of multiple recent  inadequate medication trials, and multiple psychosocial stressors including history and recent psychiatric hospitalizations. Substance use does not appear being a contributing factor in presentation.  Other psychosocial stressors including loss, trauma, chronic mental health issues, family dynamics and medical issues, including concerns for long COVID infection. Significant symptoms include SI, irritable, depressed, mood lability, sleep issues and poor frustration tolerance. Her last psychiatric hospitalization was in 3/22.  She is currently followed by  Baldev Hood MD at [facility]. Current psychosocial stressors include romantic issues, loss, trauma, chronic mental health issues, family dynamics and medical issues which she has been coping with by withdrawing, acting out to self and acting out to others.  Patient's support system includes family and outpatient team [facility].  Substance use does not appear to be playing a contributing role in the patient's presentation.  There is genetic loading for psychosis and CD. Medical history does appear to be significant for obesity, Vitamin D deficiency and thyroid disorder.  Psychologically history of trauma and maladaptive coping mechanisms seems to be bth precipitating and perpetuating  factors in presentation. The MSE is notable for Mood dysregulation, poor distress tolerance, dramatic affect. She denies self injurious behaviors.  Her definitive diagnosis is still in evolution; differential includes MDD, complex PTSD, borderline personality disorder.  Additionally, she has traits of borderline personality disorder which interfere with her ability to adhere with the treatment plan.  Optimization of medications to target these symptom clusters in addition to evaluation of adquate outpatient supports were  targets for treatment during this admission.        ASSESSMENT           Discharge Assessment  How are you doing now that you are home?: Patient shares that she is doing better but doesn't feel like being at  was real helpful as there wasn't anyone willing to have her try new medicaitons. SW extends apology for this. Patient shares that [facility], JONATHAN offers to send her instructions on how to do this. Pt shares email as [email or web address] Patient declines other needs at this time and plans to attend all upcoming appt's.  How are your symptoms? (Red Flag symptoms escalate to triage hotline per guidelines): Improved  Do you feel your condition is stable enough to be safe at home until your provider visit?: Yes  Does the patient have their discharge instructions? : Yes  Does the patient have questions regarding their discharge instructions? : No  Were you started on any new medications or were there changes to any of your previous medications? : No  Does the patient have all of their medications?: Yes  Do you have questions regarding any of your medications? : No  Do you have all of your needed medical supplies or equipment (DME)?  (i.e. oxygen tank, CPAP, cane, etc.): Yes  Discharge follow-up appointment scheduled within 14 calendar days? : Yes  Discharge Follow Up Appointment Date: 04/28/22  Discharge Follow Up Appointment Scheduled with?: Specialty Care  Provider    Post-op ([facility] Only)  If the patient had a surgery or procedure, do they have any questions for a nurse?: No    Post-op (Clinicians Only)  Did the patient have surgery or a procedure: No  Fever: No  Chills: No        PLAN                                                      Outpatient Plan:  Health Care Follow-up:   Appointment:  Psychiatrist: Baldev Hood MD: Intake appt. 4/28/22  [facility] [place]  [address] [place], [place] [address] [phone]               Permanent Therapist Leno Burciaga PsyD: first appt.  5/3/22  [address],  [place] [address] [phone]     Therapist: Temp. Therapist: Latha ELAINE next appt. 4/13/22  [address] [place] [address]  Phone: [phone]  Fax: [phone]     [facility] worker:  [facility],  [address],  [place] [address] [phone]        Pt seen and discussed with my attending physician, Dr. Lilibeth Carrillo M.D.   Be Covarrubias MD  PGY-1 Psychiatry Resident       No future appointments.      For any urgent concerns, please contact our 24 hour nurse triage line: [phone] ([phone])         QUEENIE Marin

## 2022-05-07 ENCOUNTER — HEALTH MAINTENANCE LETTER (OUTPATIENT)
Age: 32
End: 2022-05-07

## 2023-04-22 ENCOUNTER — HEALTH MAINTENANCE LETTER (OUTPATIENT)
Age: 33
End: 2023-04-22

## 2023-06-02 ENCOUNTER — HEALTH MAINTENANCE LETTER (OUTPATIENT)
Age: 33
End: 2023-06-02

## 2024-06-29 ENCOUNTER — HEALTH MAINTENANCE LETTER (OUTPATIENT)
Age: 34
End: 2024-06-29